# Patient Record
Sex: FEMALE | Race: WHITE | Employment: OTHER | URBAN - METROPOLITAN AREA
[De-identification: names, ages, dates, MRNs, and addresses within clinical notes are randomized per-mention and may not be internally consistent; named-entity substitution may affect disease eponyms.]

---

## 2017-06-21 ENCOUNTER — HOSPITAL ENCOUNTER (OUTPATIENT)
Dept: MAMMOGRAPHY | Age: 77
Discharge: HOME | End: 2017-06-21
Attending: INTERNAL MEDICINE
Payer: MEDICARE

## 2017-06-21 DIAGNOSIS — Z12.31 VISIT FOR SCREENING MAMMOGRAM: ICD-10-CM

## 2017-06-21 PROCEDURE — 77067 SCR MAMMO BI INCL CAD: CPT

## 2017-10-24 PROBLEM — G47.10 HYPERSOMNIA: Status: ACTIVE | Noted: 2017-10-24

## 2017-10-24 PROBLEM — G47.00 PERSISTENT DISORDER OF INITIATING OR MAINTAINING SLEEP: Status: ACTIVE | Noted: 2017-10-24

## 2017-10-24 PROBLEM — Z99.89 OSA ON CPAP: Status: ACTIVE | Noted: 2017-10-24

## 2017-10-24 PROBLEM — G47.33 OSA ON CPAP: Status: ACTIVE | Noted: 2017-10-24

## 2017-10-24 PROBLEM — G47.34 NOCTURNAL HYPOXEMIA: Status: ACTIVE | Noted: 2017-10-24

## 2018-01-18 ENCOUNTER — APPOINTMENT (OUTPATIENT)
Dept: GENERAL RADIOLOGY | Age: 78
End: 2018-01-18
Attending: EMERGENCY MEDICINE
Payer: MEDICARE

## 2018-01-18 ENCOUNTER — HOSPITAL ENCOUNTER (EMERGENCY)
Age: 78
Discharge: HOME | End: 2018-01-18
Attending: EMERGENCY MEDICINE
Payer: MEDICARE

## 2018-01-18 VITALS
HEIGHT: 63 IN | SYSTOLIC BLOOD PRESSURE: 168 MMHG | TEMPERATURE: 97.3 F | RESPIRATION RATE: 20 BRPM | HEART RATE: 71 BPM | OXYGEN SATURATION: 96 % | BODY MASS INDEX: 29.77 KG/M2 | WEIGHT: 168 LBS | DIASTOLIC BLOOD PRESSURE: 71 MMHG

## 2018-01-18 DIAGNOSIS — S90.31XA CONTUSION OF RIGHT FOOT, INITIAL ENCOUNTER: Primary | ICD-10-CM

## 2018-01-18 PROCEDURE — 99281 EMR DPT VST MAYX REQ PHY/QHP: CPT | Performed by: EMERGENCY MEDICINE

## 2018-01-18 PROCEDURE — 73630 X-RAY EXAM OF FOOT: CPT

## 2018-01-19 NOTE — DISCHARGE INSTRUCTIONS

## 2018-01-19 NOTE — ED TRIAGE NOTES
Pt c/o right foot pain,yesterday ? object fell on foot from top of refrigerator, today c/o increase pain and swelling.

## 2018-01-19 NOTE — ED PROVIDER NOTES
HPI Comments: 48-year-old female states that several things fell off from on top of her refrigerator yesterday and fell on her right foot. She had pain initially, but then pain resolved. She had been walking on it all day and then suddenly developed pain when walking to the mailbox. Noticed swelling when taking off her [employer] shoes. Denies any numbness or tingling. Has not taken anything for pain. Patient is a 65 y.o. female presenting with foot injury. The history is provided by the patient. Foot Injury    Pertinent negatives include no numbness. Past Medical History:   Diagnosis Date    Chronic pain     right foot    Menopause     KARL on CPAP 10/24/2017    Persistent disorder of initiating or maintaining sleep 10/24/2017    Psychiatric disorder     anxiety-worry    Unspecified sleep apnea     cpap ([place] DOS)       Past Surgical History:   Procedure Laterality Date    HX BREAST BIOPSY      HX BREAST LUMPECTOMY      Bilateral, benign    HX CATARACT REMOVAL   &    bilateral intraocular lenses    HX CYST INCISION AND DRAINAGE Left      &     HX HYSTERECTOMY  24 yrs ago   [place] [facility] ORTHOPAEDIC      r ankle         Family History:   Problem Relation Age of Onset    Malignant Hyperthermia Neg Hx     Pseudocholinesterase Deficiency Neg Hx     Delayed Awakening Neg Hx     Post-op Nausea/Vomiting Neg Hx     Emergence Delirium Neg Hx     Post-op Cognitive Dysfunction Neg Hx     Other Neg Hx     Breast Cancer Neg Hx     Heart Failure Mother     Cancer Father      parotid gland    Kidney Disease Sister       age 54 from kidney disease    Heart Failure Brother     Dementia Sister    Natalie Frantz Alzheimer Sister       age 80    Dementia Sister     Dementia Sister      vasculat-stroke       Social History     Social History    Marital status:      Spouse name: N/A    Number of children: N/A    Years of education: N/A     Occupational History    Not on file. Social History Main Topics    Smoking status: Never Smoker    Smokeless tobacco: Never Used    Alcohol use No    Drug use: No    Sexual activity: Not on file     Other Topics Concern    Not on file     Social History Narrative         ALLERGIES: Depakote [divalproex] and Penicillins    Review of Systems   Constitutional: Negative for fever. Musculoskeletal: Positive for arthralgias. Negative for joint swelling. Skin: Negative for wound. Neurological: Negative for weakness and numbness. Vitals:    01/18/18 2037   BP: 168/71   Pulse: 71   Resp: 20   Temp: 97.3 °F (36.3 °C)   SpO2: 96%   Weight: 76.2 kg (168 lb)   Height: 5' 3\" (1.6 m)            Physical Exam   Constitutional: She appears well-developed and well-nourished. HENT:   Head: Normocephalic and atraumatic. Eyes: Conjunctivae are normal. Pupils are equal, round, and reactive to light. Neck: Neck supple. Musculoskeletal:        Right foot: There is tenderness. There is normal range of motion, no swelling, normal capillary refill and no deformity. Feet:    Neurological: She is alert. Skin: Skin is warm and dry. Psychiatric: She has a normal mood and affect. Nursing note and vitals reviewed. MDM  Number of Diagnoses or Management Options  Diagnosis management comments: Parts of this document were created using dragon voice recognition software. The chart has been reviewed but errors may still be present. No fx. Possible contusion/sprain. Advised pcp follow up. I discussed the results of all labs, procedures, radiographs, and treatments with the patient and available family. Treatment plan is agreed upon and the patient is ready for discharge. Questions about treatment in the ED and differential diagnosis of presenting condition were answered.   Patient was given verbal discharge instructions including, but not limited to, importance of returning to the emergency department for any concern of worsening or continued symptoms. Instructions were given to follow up with a primary care provider or specialist within 1-2 days. Adverse effects of medications, if prescribed, were discussed and patient was advised to refrain from significant physical activity until followed up by primary care physician and to not drive or operate heavy machinery after taking any sedating substances. Amount and/or Complexity of Data Reviewed  Tests in the radiology section of CPT®: ordered and reviewed (Xr Foot Rt Min 3 V    Result Date: 1/18/2018  EXAM:  XR FOOT RT MIN 3 V INDICATION:   injury COMPARISON:  None. FINDINGS:  3 views of the right foot demonstrate a healed stress fracture the fifth metatarsal diaphysis. No acute fractures are identified. Bones are anatomically aligned.  The soft tissues are normal..      IMPRESSION: No acute abnormalities.     )      ED Course       Procedures

## 2018-07-19 ENCOUNTER — HOSPITAL ENCOUNTER (OUTPATIENT)
Dept: MAMMOGRAPHY | Age: 78
Discharge: HOME | End: 2018-07-19
Attending: INTERNAL MEDICINE
Payer: MEDICARE

## 2018-07-19 DIAGNOSIS — Z12.31 VISIT FOR SCREENING MAMMOGRAM: ICD-10-CM

## 2018-07-19 PROCEDURE — 77067 SCR MAMMO BI INCL CAD: CPT

## 2019-12-19 ENCOUNTER — HOSPITAL ENCOUNTER (OUTPATIENT)
Dept: MAMMOGRAPHY | Age: 79
Discharge: HOME | End: 2019-12-19
Attending: INTERNAL MEDICINE
Payer: MEDICARE

## 2019-12-19 DIAGNOSIS — Z12.31 ENCOUNTER FOR SCREENING MAMMOGRAM FOR MALIGNANT NEOPLASM OF BREAST: ICD-10-CM

## 2019-12-19 PROCEDURE — 77067 SCR MAMMO BI INCL CAD: CPT

## 2020-07-10 PROBLEM — Z00.00 ANNUAL PHYSICAL EXAM: Status: ACTIVE | Noted: 2020-07-10

## 2020-08-09 PROBLEM — Z00.00 ANNUAL PHYSICAL EXAM: Status: RESOLVED | Noted: 2020-07-10 | Resolved: 2020-08-09

## 2020-11-20 ENCOUNTER — TRANSCRIBE ORDER (OUTPATIENT)
Dept: SCHEDULING | Age: 80
End: 2020-11-20

## 2020-11-20 DIAGNOSIS — Z12.31 SCREENING MAMMOGRAM, ENCOUNTER FOR: Primary | ICD-10-CM

## 2020-12-23 ENCOUNTER — HOSPITAL ENCOUNTER (OUTPATIENT)
Dept: MAMMOGRAPHY | Age: 80
Discharge: HOME | End: 2020-12-23
Attending: INTERNAL MEDICINE
Payer: MEDICARE

## 2020-12-23 DIAGNOSIS — Z12.31 SCREENING MAMMOGRAM, ENCOUNTER FOR: ICD-10-CM

## 2020-12-23 PROCEDURE — 77067 SCR MAMMO BI INCL CAD: CPT

## 2021-12-27 ENCOUNTER — TRANSCRIBE ORDER (OUTPATIENT)
Dept: SCHEDULING | Age: 81
End: 2021-12-27

## 2021-12-27 DIAGNOSIS — Z12.31 VISIT FOR SCREENING MAMMOGRAM: Primary | ICD-10-CM

## 2021-12-30 ENCOUNTER — HOSPITAL ENCOUNTER (OUTPATIENT)
Dept: MAMMOGRAPHY | Age: 81
Discharge: HOME | End: 2021-12-30
Attending: INTERNAL MEDICINE
Payer: MEDICARE

## 2021-12-30 ENCOUNTER — TRANSCRIBE ORDER (OUTPATIENT)
Dept: REGISTRATION | Age: 81
End: 2021-12-30

## 2021-12-30 DIAGNOSIS — Z12.31 SCREENING MAMMOGRAM FOR HIGH-RISK PATIENT: Primary | ICD-10-CM

## 2021-12-30 DIAGNOSIS — Z12.31 SCREENING MAMMOGRAM FOR HIGH-RISK PATIENT: ICD-10-CM

## 2021-12-30 PROCEDURE — 77067 SCR MAMMO BI INCL CAD: CPT

## 2022-03-18 PROBLEM — G47.00 PERSISTENT DISORDER OF INITIATING OR MAINTAINING SLEEP: Status: ACTIVE | Noted: 2017-10-24

## 2022-03-18 PROBLEM — G47.34 NOCTURNAL HYPOXEMIA: Status: ACTIVE | Noted: 2017-10-24

## 2022-03-19 PROBLEM — G47.33 OSA ON CPAP: Status: ACTIVE | Noted: 2017-10-24

## 2022-03-19 PROBLEM — Z99.89 OSA ON CPAP: Status: ACTIVE | Noted: 2017-10-24

## 2022-03-19 PROBLEM — G47.10 HYPERSOMNIA: Status: ACTIVE | Noted: 2017-10-24

## 2022-08-27 ENCOUNTER — HOSPITAL ENCOUNTER (EMERGENCY)
Dept: GENERAL RADIOLOGY | Age: 82
Discharge: HOME | End: 2022-08-30
Payer: MEDICARE

## 2022-08-27 ENCOUNTER — HOSPITAL ENCOUNTER (EMERGENCY)
Age: 82
Discharge: HOME | End: 2022-08-27
Attending: EMERGENCY MEDICINE
Payer: MEDICARE

## 2022-08-27 VITALS
HEART RATE: 65 BPM | OXYGEN SATURATION: 97 % | HEIGHT: 63 IN | RESPIRATION RATE: 16 BRPM | WEIGHT: 166 LBS | DIASTOLIC BLOOD PRESSURE: 75 MMHG | TEMPERATURE: 97.8 F | BODY MASS INDEX: 29.41 KG/M2 | SYSTOLIC BLOOD PRESSURE: 176 MMHG

## 2022-08-27 DIAGNOSIS — F41.9 ANXIETY DISORDER, UNSPECIFIED TYPE: Primary | ICD-10-CM

## 2022-08-27 DIAGNOSIS — R03.0 TRANSIENT HYPERTENSION: ICD-10-CM

## 2022-08-27 DIAGNOSIS — F43.0 STRESS REACTION: ICD-10-CM

## 2022-08-27 LAB
ALBUMIN SERPL-MCNC: 3.8 G/DL (ref 3.2–4.6)
ALBUMIN/GLOB SERPL: 1 {RATIO} (ref 1.2–3.5)
ALP SERPL-CCNC: 75 U/L (ref 50–136)
ALT SERPL-CCNC: 25 U/L (ref 12–65)
ANION GAP SERPL CALC-SCNC: 6 MMOL/L (ref 7–16)
AST SERPL-CCNC: 18 U/L (ref 15–37)
BILIRUB SERPL-MCNC: 0.1 MG/DL (ref 0.2–1.1)
BUN SERPL-MCNC: 25 MG/DL (ref 8–23)
CALCIUM SERPL-MCNC: 9.2 MG/DL (ref 8.3–10.4)
CHLORIDE SERPL-SCNC: 105 MMOL/L (ref 98–107)
CO2 SERPL-SCNC: 27 MMOL/L (ref 21–32)
CREAT SERPL-MCNC: 0.86 MG/DL (ref 0.6–1)
ERYTHROCYTE [DISTWIDTH] IN BLOOD BY AUTOMATED COUNT: 12.9 % (ref 11.9–14.6)
GLOBULIN SER CALC-MCNC: 3.7 G/DL (ref 2.3–3.5)
GLUCOSE SERPL-MCNC: 113 MG/DL (ref 65–100)
HCT VFR BLD AUTO: 40.6 % (ref 35.8–46.3)
HGB BLD-MCNC: 13.4 G/DL (ref 11.7–15.4)
MAGNESIUM SERPL-MCNC: 2.6 MG/DL (ref 1.8–2.4)
MCH RBC QN AUTO: 30.9 PG (ref 26.1–32.9)
MCHC RBC AUTO-ENTMCNC: 33 G/DL (ref 31.4–35)
MCV RBC AUTO: 93.5 FL (ref 79.6–97.8)
NRBC # BLD: 0 K/UL (ref 0–0.2)
PLATELET # BLD AUTO: 247 K/UL (ref 150–450)
PMV BLD AUTO: 9.7 FL (ref 9.4–12.3)
POTASSIUM SERPL-SCNC: 4.3 MMOL/L (ref 3.5–5.1)
PROT SERPL-MCNC: 7.5 G/DL (ref 6.3–8.2)
RBC # BLD AUTO: 4.34 M/UL (ref 4.05–5.2)
SODIUM SERPL-SCNC: 138 MMOL/L (ref 136–145)
TROPONIN I SERPL HS-MCNC: 6.7 PG/ML (ref 0–14)
WBC # BLD AUTO: 7 K/UL (ref 4.3–11.1)

## 2022-08-27 PROCEDURE — 93005 ELECTROCARDIOGRAM TRACING: CPT | Performed by: EMERGENCY MEDICINE

## 2022-08-27 PROCEDURE — 85027 COMPLETE CBC AUTOMATED: CPT

## 2022-08-27 PROCEDURE — 71045 X-RAY EXAM CHEST 1 VIEW: CPT

## 2022-08-27 PROCEDURE — 83735 ASSAY OF MAGNESIUM: CPT

## 2022-08-27 PROCEDURE — 80053 COMPREHEN METABOLIC PANEL: CPT

## 2022-08-27 PROCEDURE — 84484 ASSAY OF TROPONIN QUANT: CPT

## 2022-08-27 PROCEDURE — 99285 EMERGENCY DEPT VISIT HI MDM: CPT

## 2022-08-27 RX ORDER — GLUCOSAMINE/CHONDR SU A SOD 750-600 MG
TABLET ORAL DAILY
COMMUNITY

## 2022-08-27 ASSESSMENT — ENCOUNTER SYMPTOMS
PHOTOPHOBIA: 0
BACK PAIN: 0
TROUBLE SWALLOWING: 0
VOMITING: 0
WHEEZING: 0
COLOR CHANGE: 0
EYE REDNESS: 0
SHORTNESS OF BREATH: 0
ABDOMINAL PAIN: 0
CHEST TIGHTNESS: 1
NAUSEA: 0
VOICE CHANGE: 0

## 2022-08-27 ASSESSMENT — PAIN - FUNCTIONAL ASSESSMENT: PAIN_FUNCTIONAL_ASSESSMENT: NONE - DENIES PAIN

## 2022-08-27 NOTE — ED NOTES
I have reviewed discharge instructions with the patient. The patient verbalized understanding. Patient left ED via Discharge Method: ambulatory to Home with self. Elva Ortez Opportunity for questions and clarification provided. Patient given 0 scripts. To continue your aftercare when you leave the hospital, you may receive an automated call from our care team to check in on how you are doing. This is a free service and part of our promise to provide the best care and service to meet your aftercare needs.  If you have questions, or wish to unsubscribe from this service please call [phone]. Thank you for Choosing our [facility] Department.         Liv Goldman RN  08/27/22 3090

## 2022-08-27 NOTE — DISCHARGE INSTRUCTIONS
As we discussed, it is important that you call and speak to your primary care physician and discussed your blood pressure issues however also discussed their degree of stress and anxiety you have been dealing with  Follow-up with your pulmonologist as well  I suggest that she only take her blood pressure twice daily  Return to the ER for any new, worsening or life-threatening symptoms Patient rescheduled to 12/13/2021.

## 2022-08-27 NOTE — ED NOTES
Pt resting in NAD. MD Brynn Kline will go update pt at this time.       Dione Meyers RN  08/27/22 6026

## 2022-08-27 NOTE — ED PROVIDER NOTES
[facility] Provider Note                   PCP:                None Provider               Age: 79 y.o. Sex: female     No diagnosis found. DISPOSITION          MDM  Number of Diagnoses or Management Options  Anxiety disorder, unspecified type  Stress reaction  Transient hypertension  Diagnosis management comments: Well-appearing. Plan for screening labs EKG.    5:26 PM  Laboratory testing here stable including cardiac enzymes and electrolytes. I do not feel she needs serial cardiac enzymes given the recurrence of her symptoms and timeframe of most recent episode being greater than 6 hours. 6:08 PM  Discussed with patient given results of testing. I feel she is stable for discharge. I have encouraged her to speak to her primary care physician concerning her degree of stress and anxiety at this most likely is a contributing factor for her blood pressure issues. However going forward I feel it is only necessary that she takes her blood pressure twice daily, if she continues to have some elevated readings she may need to be initiated on antihypertensive medications.   However I will defer that to her PCP       Amount and/or Complexity of Data Reviewed  Clinical lab tests: ordered and reviewed  Tests in the radiology section of CPT®: ordered and reviewed  Independent visualization of images, tracings, or specimens: yes (EKG interpretation: Sinus rhythm, rate of 76, normal axis, no blocks, no acute ST segment changes)    Risk of Complications, Morbidity, and/or Mortality  Presenting problems: moderate  Diagnostic procedures: moderate  Management options: moderate    Patient Progress  Patient progress: stable       Orders Placed This Encounter   Procedures    XR CHEST PORTABLE    CBC    Comprehensive Metabolic Panel    Troponin    Magnesium    Cardiac Monitor    Pulse Oximetry    EKG 12 Lead    Saline lock IV        Medications - No data to display    New Prescriptions    No medications on file        Kenyon Peng is a 79 y.o. female who presents to the Emergency Department with chief complaint of    Chief Complaint   Patient presents with    Palpitations     Patient presents complaining of \"racing heart rate\" and irregular blood pressures. Patient reports ongoing symptoms since COVID over the summer. Patient presents the ER with complaints of elevated heart rate, hypertension, chest discomfort. Patient states for the past couple days she has had intermittent episodes of \"deep blast in her chest\". She describes it as a centralized discomfort and feels that her heart is racing. States she immediately gets up and checks her blood pressure. States her blood pressure has been elevated. She is discussed this with her primary care physician. States that her blood pressure tends to vary. Does not take any blood pressure medication. She does report issues with stress and anxiety    The history is provided by the patient. Palpitations  Palpitations quality:  Irregular  Onset quality:  Gradual  Timing:  Intermittent  Chronicity:  New  Context: anxiety    Context: not dehydration and not exercise    Relieved by:  Nothing  Worsened by:  Nothing  Associated symptoms: chest pressure    Associated symptoms: no back pain, no chest pain, no dizziness, no malaise/fatigue, no nausea, no numbness, no shortness of breath, no syncope, no vomiting and no weakness        Review of Systems   Constitutional:  Negative for fatigue, fever, malaise/fatigue and unexpected weight change. HENT:  Negative for congestion, dental problem, tinnitus, trouble swallowing and voice change. Eyes:  Negative for photophobia and redness. Respiratory:  Positive for chest tightness. Negative for shortness of breath and wheezing. Cardiovascular:  Positive for palpitations. Negative for chest pain and syncope. Gastrointestinal:  Negative for abdominal pain, nausea and vomiting.    Endocrine: Negative for polydipsia, polyphagia and polyuria. Genitourinary:  Negative for decreased urine volume, flank pain, hematuria, vaginal discharge and vaginal pain. Musculoskeletal:  Negative for back pain, gait problem, joint swelling and neck stiffness. Skin:  Negative for color change and pallor. Allergic/Immunologic: Negative for food allergies and immunocompromised state. Neurological:  Negative for dizziness, syncope, speech difficulty, weakness and numbness. Hematological:  Negative for adenopathy. Does not bruise/bleed easily. Psychiatric/Behavioral:  Negative for behavioral problems, hallucinations, sleep disturbance and suicidal ideas. The patient is not hyperactive. All other systems reviewed and are negative. Past Medical History:   Diagnosis Date    Chronic pain     right foot    Menopause     BALJINDER on CPAP 10/24/2017    Persistent disorder of initiating or maintaining sleep 10/24/2017    Psychiatric disorder     anxiety-worry    Unspecified sleep apnea     cpap ([place] DOS)        Past Surgical History:   Procedure Laterality Date    BREAST BIOPSY Bilateral     negative per pt    BREAST CYST INCISION AND DRAINAGE Left      &     CATARACT REMOVAL   &    bilateral intraocular lenses    HYSTERECTOMY  24 yrs ago    ORTHOPEDIC SURGERY      r ankle        Family History   Problem Relation Age of Onset    Heart Failure Mother     Cancer Father         parotid gland    Kidney Disease Sister          age 54 from kidney disease    Heart Failure Brother     Dementia Sister     Alzheimer's Disease Sister          age 80    Dementia Sister     Dementia Sister         vasculat-stroke    Malig Hypertherm Neg Hx     Pseudochol.  Deficiency Neg Hx     Delayed Awakening Neg Hx     Post-op Nausea/Vomiting Neg Hx     Emergence Delirium Neg Hx     Post-op Cognitive Dysfunction Neg Hx     Other Neg Hx     Breast Cancer Neg Hx         Social History     Socioeconomic History    Marital status:    Tobacco Use    Smoking status: Never    Smokeless tobacco: Never   Substance and Sexual Activity    Alcohol use: No    Drug use: No         Penicillins and Valproic acid     Previous Medications    ASCORBIC ACID (VITAMIN C) 500 MG TABLET    Take by mouth daily as needed    COBALAMIN COMBINATIONS (B-12) 100-5000 MCG SUBL    Place under the tongue    MAGNESIUM OXIDE (MAG-OX) 400 MG TABLET    Take 400 mg by mouth    VITAMIN D3 (CHOLECALCIFEROL) 125 MCG (5000 UT) TABS TABLET    Take by mouth daily        Vitals signs and nursing note reviewed. Patient Vitals for the past 4 hrs:   Temp Pulse Resp BP SpO2   08/27/22 1539 98.4 °F (36.9 °C) 81 18 (!) 159/82 95 %          Physical Exam  Vitals and nursing note reviewed. Constitutional:       General: She is not in acute distress. Appearance: Normal appearance. She is not ill-appearing. HENT:      Head: Normocephalic and atraumatic. Right Ear: External ear normal.      Left Ear: External ear normal.   Eyes:      Extraocular Movements: Extraocular movements intact. Pupils: Pupils are equal, round, and reactive to light. Cardiovascular:      Rate and Rhythm: Normal rate and regular rhythm. Pulses: Normal pulses. Heart sounds: No murmur heard. No friction rub. Pulmonary:      Effort: Pulmonary effort is normal. No respiratory distress. Breath sounds: Normal breath sounds. No stridor. No rhonchi. Abdominal:      General: Abdomen is flat. There is no distension. Palpations: Abdomen is soft. There is no mass. Tenderness: There is no abdominal tenderness. Hernia: No hernia is present. Musculoskeletal:         General: No swelling, tenderness or signs of injury. Cervical back: Normal range of motion. No rigidity or tenderness. Skin:     General: Skin is warm. Coloration: Skin is not jaundiced or pale. Findings: No bruising or erythema. Neurological:      General: No focal deficit present. Mental Status: She is alert and oriented to person, place, and time. Cranial Nerves: No cranial nerve deficit. Sensory: No sensory deficit. Motor: No weakness. Psychiatric:         Mood and Affect: Mood normal.         Behavior: Behavior normal.        Procedures      Results Include:    Recent Results (from the past 24 hour(s))   CBC    Collection Time: 08/27/22  3:46 PM   Result Value Ref Range    WBC 7.0 4.3 - 11.1 K/uL    RBC 4.34 4.05 - 5.2 M/uL    Hemoglobin 13.4 11.7 - 15.4 g/dL    Hematocrit 40.6 35.8 - 46.3 %    MCV 93.5 79.6 - 97.8 FL    MCH 30.9 26.1 - 32.9 PG    MCHC 33.0 31.4 - 35.0 g/dL    RDW 12.9 11.9 - 14.6 %    Platelets 111 149 - 639 K/uL    MPV 9.7 9.4 - 12.3 FL    nRBC 0.00 0.0 - 0.2 K/uL          XR CHEST PORTABLE    (Results Pending)                          Voice dictation software was used during the making of this note. This software is not perfect and grammatical and other typographical errors may be present. This note has not been completely proofread for errors.      Jolanta Tian MD  08/27/22 6747 [facility], MD  08/27/22 368 Monroe Wing MD  08/27/22 6017

## 2022-08-27 NOTE — ED TRIAGE NOTES
Patient presents complaining of \"racing heart rate\" and irregular blood pressures. Patient reports ongoing symptoms since COVID over the summer.

## 2022-08-28 LAB
EKG ATRIAL RATE: 78 BPM
EKG DIAGNOSIS: NORMAL
EKG P AXIS: 50 DEGREES
EKG P-R INTERVAL: 146 MS
EKG Q-T INTERVAL: 392 MS
EKG QRS DURATION: 80 MS
EKG QTC CALCULATION (BAZETT): 446 MS
EKG R AXIS: -14 DEGREES
EKG T AXIS: 39 DEGREES
EKG VENTRICULAR RATE: 78 BPM

## 2022-09-19 NOTE — PROGRESS NOTES
[address] [place] [address] Court, [address] [facility]  [phone]    Patient Name:  Neal Borrero  YOB: 1940      Office Visit 9/20/2022    CHIEF COMPLAINT:    Chief Complaint   Patient presents with    Sleep Apnea         HISTORY OF PRESENT ILLNESS:  Patient is seen today for follow up of BALJINDER. Patient was diagnosed with sleep apnea in 1998 with AHI 18.6/hr with lowest oxygen saturation 89%. She is prescribed APAP  10-15 cm using a nasal mask. Download reveals 100% compliance on therapy with average nightly  use 6.5 hours. AHI is 6/hr with average pressure used 10.9-14.1 cm. She is having both obstructive and central events. She reports having covid in May. She has recovered well but is having more fatigue since having covid. She does report weight gain of about 10 lbs from being less active. She is using a nasal mask but is waking with a dry mouth. She feels the pressure is too low in the night. Will add chin strap and she will call prior to next visit if she continues to feel as if she needs more air. She was seen in the ER in August for hypertension but attributes this to anxiety and stress. She is apart of a group called [employer] through [employer] and is discussing things from her past causing anxiety while she is working through these issues.          Past Medical History:   Diagnosis Date    Chronic pain     right foot    Menopause     BALJINDER on CPAP 10/24/2017    Persistent disorder of initiating or maintaining sleep 10/24/2017    Psychiatric disorder     anxiety-worry    Unspecified sleep apnea     cpap ([place] DOS)         Patient Active Problem List   Diagnosis    Persistent disorder of initiating or maintaining sleep    Nocturnal hypoxemia    Hypersomnia    BALJINDER on CPAP          Past Surgical History:   Procedure Laterality Date    BREAST BIOPSY Bilateral 1996    negative per pt    CATARACT REMOVAL  7/98 &9/98    bilateral intraocular lenses    CYST INCISION AND DRAINAGE Left      &     HYSTERECTOMY (CERVIX STATUS UNKNOWN)  24 yrs ago    ORTHOPEDIC SURGERY      r ankle           Social History     Socioeconomic History    Marital status:      Spouse name: Not on file    Number of children: Not on file    Years of education: Not on file    Highest education level: Not on file   Occupational History    Not on file   Tobacco Use    Smoking status: Never    Smokeless tobacco: Never   Substance and Sexual Activity    Alcohol use: No    Drug use: No    Sexual activity: Not on file   Other Topics Concern    Not on file   Social History Narrative    Not on file     Social Determinants of Health     Financial Resource Strain: Not on file   Food Insecurity: Not on file   Transportation Needs: Not on file   Physical Activity: Not on file   Stress: Not on file   Social Connections: Not on file   Intimate Partner Violence: Not on file   Housing Stability: Not on file         Family History   Problem Relation Age of Onset    Heart Failure Mother     Cancer Father         parotid gland    Kidney Disease Sister          age 54 from kidney disease    Heart Failure Brother     Dementia Sister     Alzheimer's Disease Sister          age 80    Dementia Sister     Dementia Sister         vasculat-stroke    Malig Hypertherm Neg Hx     Pseudochol.  Deficiency Neg Hx     Delayed Awakening Neg Hx     Post-op Nausea/Vomiting Neg Hx     Emergence Delirium Neg Hx     Post-op Cognitive Dysfunction Neg Hx     Other Neg Hx     Breast Cancer Neg Hx          Allergies   Allergen Reactions    Penicillins Other (See Comments) and Rash    Valproic Acid Other (See Comments)     pain         Current Outpatient Medications   Medication Sig    cyanocobalamin 1000 MCG tablet Take 1,000 mcg by mouth daily    Lutein-Zeaxanthin 25-5 MG CAPS Take by mouth daily    ascorbic acid (VITAMIN C) 500 MG tablet Take by mouth daily as needed    vitamin D3 (CHOLECALCIFEROL) 125 MCG (5000 UT) TABS tablet Take by mouth daily    Cobalamin Combinations (B-12) 100-5000 MCG SUBL Place under the tongue    magnesium oxide (MAG-OX) 400 MG tablet Take 400 mg by mouth     No current facility-administered medications for this visit. REVIEW OF SYSTEMS:   CONSTITUTIONAL:   There is no history of fever, chills, night sweats, weight loss, weight gain, persistent fatigue, or lethargy/hypersomnolence. CARDIAC:   No chest pain, pressure, discomfort, palpitations, orthopnea, murmurs, or edema. GI:   No dysphagia, heartburn reflux, nausea/vomiting, diarrhea, abdominal pain, or bleeding. NEURO:   There is no history of AMS, persistent headache, decreased level of consciousness, seizures, or motor or sensory deficits. PHYSICAL EXAM:    Vitals:    09/20/22 1128   BP: 116/70   Pulse: 97   Resp: 14   Temp: 98.1 °F (36.7 °C)   SpO2: 93%   Weight: 167 lb (75.8 kg)   Height: 5' 3\" (1.6 m)        BMI 29.58       GENERAL APPEARANCE:   The patient is normal weight and in no respiratory distress. HEENT:   PERRL. Conjunctivae unremarkable. Nasal mucosa is without epistaxis, exudate, or polyps. Gums and dentition are unremarkable. There is no oropharyngeal narrowing. TMs are clear. NECK/LYMPHATIC:   Symmetrical with no elevation of jugular venous pulsation. Trachea midline. No thyroid enlargement. No cervical adenopathy. LUNGS:   Normal respiratory effort with symmetrical lung expansion. Breath sounds clear bilaterally. HEART:   There is a regular rate and rhythm. No murmur, rub, or gallop. There is no edema in the lower extremities. ABDOMEN:   Soft and non-tender. No hepatosplenomegaly. Bowel sounds are normal.     NEURO:   The patient is alert and oriented to person, place, and time. Memory appears intact and mood is normal.  No gross sensorimotor deficits are present. ASSESSMENT:  (Medical Decision Making)      Diagnosis Orders   1.  BALJINDER on CPAP  DME - DURABLE MEDICAL EQUIPMENT Tubing (1 per 3 mon)  (   X  )- Disposable Filter (2 per mon)  (   x  )-Btjqcg Humidifier (1 per year)     (  x   )-Dhdecpeml (sometimes used with Full Face Mask) (1 per 6 mos)  (    )-Tubing-without heat (1 per 3 mos)  (     )-Non-Disposable Filter (1 per 6 mos)  (  x   )-Water Chamber (1 per 6 mos)  (     )-Humidifier non-heated (1 per 5 yrs)      Signed Date: 9/20/2022  Electronically Signed By: DEONTE Lopez CNP  Electronically Dated:  9/20/2022       No orders of the defined types were placed in this encounter. Collaborating Physician: Dr. Latisha Mccoy    Over 50% of today's office visit was spent in face to face time reviewing test results, prognosis, importance of compliance, education about disease process, benefits of medications, instructions for management of acute flare-ups, and follow up plans. Total face to face time spent with patient was 20 minutes.         DEONTE Lopez CNP  Electronically signed

## 2022-09-20 ENCOUNTER — OFFICE VISIT (OUTPATIENT)
Dept: SLEEP MEDICINE | Age: 82
End: 2022-09-20
Payer: MEDICARE

## 2022-09-20 VITALS
HEIGHT: 63 IN | RESPIRATION RATE: 14 BRPM | WEIGHT: 167 LBS | SYSTOLIC BLOOD PRESSURE: 116 MMHG | BODY MASS INDEX: 29.59 KG/M2 | HEART RATE: 97 BPM | TEMPERATURE: 98.1 F | DIASTOLIC BLOOD PRESSURE: 70 MMHG | OXYGEN SATURATION: 93 %

## 2022-09-20 DIAGNOSIS — Z99.89 OSA ON CPAP: Primary | ICD-10-CM

## 2022-09-20 DIAGNOSIS — G47.34 NOCTURNAL HYPOXEMIA: ICD-10-CM

## 2022-09-20 DIAGNOSIS — G47.33 OSA ON CPAP: Primary | ICD-10-CM

## 2022-09-20 PROCEDURE — 1036F TOBACCO NON-USER: CPT | Performed by: NURSE PRACTITIONER

## 2022-09-20 PROCEDURE — G8427 DOCREV CUR MEDS BY ELIG CLIN: HCPCS | Performed by: NURSE PRACTITIONER

## 2022-09-20 PROCEDURE — G8419 CALC BMI OUT NRM PARAM NOF/U: HCPCS | Performed by: NURSE PRACTITIONER

## 2022-09-20 PROCEDURE — 1123F ACP DISCUSS/DSCN MKR DOCD: CPT | Performed by: NURSE PRACTITIONER

## 2022-09-20 PROCEDURE — 1090F PRES/ABSN URINE INCON ASSESS: CPT | Performed by: NURSE PRACTITIONER

## 2022-09-20 PROCEDURE — 99213 OFFICE O/P EST LOW 20 MIN: CPT | Performed by: NURSE PRACTITIONER

## 2022-09-20 PROCEDURE — G8400 PT W/DXA NO RESULTS DOC: HCPCS | Performed by: NURSE PRACTITIONER

## 2022-09-20 ASSESSMENT — SLEEP AND FATIGUE QUESTIONNAIRES
HOW LIKELY ARE YOU TO NOD OFF OR FALL ASLEEP WHILE WATCHING TV: 3
HOW LIKELY ARE YOU TO NOD OFF OR FALL ASLEEP WHEN YOU ARE A PASSENGER IN A CAR FOR AN HOUR WITHOUT A BREAK: 2
HOW LIKELY ARE YOU TO NOD OFF OR FALL ASLEEP WHILE LYING DOWN TO REST IN THE AFTERNOON WHEN CIRCUMSTANCES PERMIT: 3
HOW LIKELY ARE YOU TO NOD OFF OR FALL ASLEEP WHILE SITTING QUIETLY AFTER LUNCH WITHOUT ALCOHOL: 2
HOW LIKELY ARE YOU TO NOD OFF OR FALL ASLEEP WHILE SITTING INACTIVE IN A PUBLIC PLACE: 3
HOW LIKELY ARE YOU TO NOD OFF OR FALL ASLEEP WHILE SITTING AND TALKING TO SOMEONE: 0
ESS TOTAL SCORE: 17
HOW LIKELY ARE YOU TO NOD OFF OR FALL ASLEEP WHILE SITTING AND READING: 3
HOW LIKELY ARE YOU TO NOD OFF OR FALL ASLEEP IN A CAR, WHILE STOPPED FOR A FEW MINUTES IN TRAFFIC: 1

## 2022-09-20 NOTE — PATIENT INSTRUCTIONS
Continue APAP 10-15 cm  Renew supplies   Add chin strap to nasal mask  Follow up in 6 months to monitor AHI or sooner if needed

## 2022-10-28 ENCOUNTER — TELEPHONE (OUTPATIENT)
Dept: PULMONOLOGY | Age: 82
End: 2022-10-28

## 2022-10-28 DIAGNOSIS — Z99.89 OSA ON CPAP: Primary | ICD-10-CM

## 2022-10-28 DIAGNOSIS — G47.33 OSA ON CPAP: Primary | ICD-10-CM

## 2022-10-28 NOTE — TELEPHONE ENCOUNTER
Patient is calling because she feels like she needs cpap turned up just a little bit.  (Pt was last seen on 9/20/22)

## 2022-11-03 NOTE — TELEPHONE ENCOUNTER
Left message on patient's voice mail of pressure change and pressure changed in airview and order put in go scripts and sent to [facility].

## 2022-12-20 ENCOUNTER — TRANSCRIBE ORDERS (OUTPATIENT)
Dept: SCHEDULING | Age: 82
End: 2022-12-20

## 2022-12-20 DIAGNOSIS — Z12.31 SCREENING MAMMOGRAM FOR HIGH-RISK PATIENT: Primary | ICD-10-CM

## 2023-01-07 ENCOUNTER — HOSPITAL ENCOUNTER (OUTPATIENT)
Dept: MAMMOGRAPHY | Age: 83
End: 2023-01-07
Payer: MEDICARE

## 2023-01-07 DIAGNOSIS — Z12.31 SCREENING MAMMOGRAM FOR HIGH-RISK PATIENT: ICD-10-CM

## 2023-01-07 PROCEDURE — 77067 SCR MAMMO BI INCL CAD: CPT

## 2023-03-17 NOTE — PROGRESS NOTES
no respiratory distress. HEENT:   PERRL. Conjunctivae unremarkable. Nasal mucosa is without epistaxis, exudate, or polyps. Gums and dentition are unremarkable. There is  oropharyngeal narrowing. NECK/LYMPHATIC:   Symmetrical with no elevation of jugular venous pulsation. Trachea midline. No thyroid enlargement. No cervical adenopathy. LUNGS:   Normal respiratory effort with symmetrical lung expansion. Breath sounds clear bilaterally . HEART:   There is a regular rate and rhythm. No murmur, rub, or gallop. There is no edema in the lower extremities. ABDOMEN:   Soft and non-tender. No hepatosplenomegaly. Bowel sounds are normal.     NEURO:   The patient is alert and oriented to person, place, and time. Memory appears intact and mood is normal.  No gross sensorimotor deficits are present. ASSESSMENT:  (Medical Decision Making)      Diagnosis Orders   1. BALJINDER on CPAP     Will adjust pressure to APAP 13-16 cm to help with sleep maintenance  Replacement machine will be ordered     2. Nocturnal hypoxemia     Improved on pap therapy      3. Hypersomnia, unspecified     Likely due to poor sleep quality and sleep maintenance. PCP has recommended lexapro and this will likely help with sleep as well     4. Persistent disordered of initiating or maintaining sleep-  she needs to put the cpap on as soon as she gets into bed. Will adjust pressure as well.        PLAN:  Change pressure to APAP 13-16cm  Replacement machine will be ordered  Try extended release melatonin  Practice routine of putting cpap on as soon as she gets into bed    Follow up will be in 6 months or sooner if needed    Orders Placed This Encounter   Procedures    DME - [address]  Replacement machine  Change pressure to APAP 13-16cm    [facility]  Phone: [phone] [address]  Dept: [phone]      Patient Name: Dany Torres  :

## 2023-03-21 ENCOUNTER — OFFICE VISIT (OUTPATIENT)
Dept: SLEEP MEDICINE | Age: 83
End: 2023-03-21
Payer: MEDICARE

## 2023-03-21 VITALS
SYSTOLIC BLOOD PRESSURE: 130 MMHG | RESPIRATION RATE: 14 BRPM | OXYGEN SATURATION: 98 % | BODY MASS INDEX: 30.94 KG/M2 | TEMPERATURE: 96.9 F | HEIGHT: 63 IN | HEART RATE: 77 BPM | DIASTOLIC BLOOD PRESSURE: 60 MMHG | WEIGHT: 174.6 LBS

## 2023-03-21 DIAGNOSIS — G47.34 NOCTURNAL HYPOXEMIA: ICD-10-CM

## 2023-03-21 DIAGNOSIS — G47.33 OSA ON CPAP: Primary | ICD-10-CM

## 2023-03-21 DIAGNOSIS — G47.00 PERSISTENT DISORDER OF INITIATING OR MAINTAINING SLEEP: ICD-10-CM

## 2023-03-21 DIAGNOSIS — Z99.89 OSA ON CPAP: Primary | ICD-10-CM

## 2023-03-21 DIAGNOSIS — G47.10 HYPERSOMNIA, UNSPECIFIED: ICD-10-CM

## 2023-03-21 PROCEDURE — 1123F ACP DISCUSS/DSCN MKR DOCD: CPT | Performed by: NURSE PRACTITIONER

## 2023-03-21 PROCEDURE — 1036F TOBACCO NON-USER: CPT | Performed by: NURSE PRACTITIONER

## 2023-03-21 PROCEDURE — G8427 DOCREV CUR MEDS BY ELIG CLIN: HCPCS | Performed by: NURSE PRACTITIONER

## 2023-03-21 PROCEDURE — 99213 OFFICE O/P EST LOW 20 MIN: CPT | Performed by: NURSE PRACTITIONER

## 2023-03-21 PROCEDURE — G8484 FLU IMMUNIZE NO ADMIN: HCPCS | Performed by: NURSE PRACTITIONER

## 2023-03-21 PROCEDURE — G8417 CALC BMI ABV UP PARAM F/U: HCPCS | Performed by: NURSE PRACTITIONER

## 2023-03-21 PROCEDURE — 1090F PRES/ABSN URINE INCON ASSESS: CPT | Performed by: NURSE PRACTITIONER

## 2023-03-21 PROCEDURE — G8400 PT W/DXA NO RESULTS DOC: HCPCS | Performed by: NURSE PRACTITIONER

## 2023-03-21 ASSESSMENT — SLEEP AND FATIGUE QUESTIONNAIRES
HOW LIKELY ARE YOU TO NOD OFF OR FALL ASLEEP IN A CAR, WHILE STOPPED FOR A FEW MINUTES IN TRAFFIC: 0
HOW LIKELY ARE YOU TO NOD OFF OR FALL ASLEEP WHILE SITTING QUIETLY AFTER LUNCH WITHOUT ALCOHOL: 2
HOW LIKELY ARE YOU TO NOD OFF OR FALL ASLEEP WHILE WATCHING TV: 0
HOW LIKELY ARE YOU TO NOD OFF OR FALL ASLEEP WHILE SITTING AND READING: 3
HOW LIKELY ARE YOU TO NOD OFF OR FALL ASLEEP WHILE SITTING INACTIVE IN A PUBLIC PLACE: 2
HOW LIKELY ARE YOU TO NOD OFF OR FALL ASLEEP WHEN YOU ARE A PASSENGER IN A CAR FOR AN HOUR WITHOUT A BREAK: 1
HOW LIKELY ARE YOU TO NOD OFF OR FALL ASLEEP WHILE SITTING AND TALKING TO SOMEONE: 1
ESS TOTAL SCORE: 12
HOW LIKELY ARE YOU TO NOD OFF OR FALL ASLEEP WHILE LYING DOWN TO REST IN THE AFTERNOON WHEN CIRCUMSTANCES PERMIT: 3

## 2023-03-21 NOTE — PATIENT INSTRUCTIONS
Change pressure to APAP 13-16 cm  Order new machine  Try extended release melatonin   Practice routine of putting cpap on as soon as you get into bed.     Follow up will be in 6 months or sooner if needed

## 2023-06-06 ENCOUNTER — OFFICE VISIT (OUTPATIENT)
Dept: ORTHOPEDIC SURGERY | Age: 83
End: 2023-06-06
Payer: MEDICARE

## 2023-06-06 DIAGNOSIS — M25.511 RIGHT SHOULDER PAIN, UNSPECIFIED CHRONICITY: Primary | ICD-10-CM

## 2023-06-06 DIAGNOSIS — M62.838 NECK MUSCLE SPASM: ICD-10-CM

## 2023-06-06 PROCEDURE — G8427 DOCREV CUR MEDS BY ELIG CLIN: HCPCS | Performed by: ORTHOPAEDIC SURGERY

## 2023-06-06 PROCEDURE — 1123F ACP DISCUSS/DSCN MKR DOCD: CPT | Performed by: ORTHOPAEDIC SURGERY

## 2023-06-06 PROCEDURE — 20610 DRAIN/INJ JOINT/BURSA W/O US: CPT | Performed by: ORTHOPAEDIC SURGERY

## 2023-06-06 PROCEDURE — G8417 CALC BMI ABV UP PARAM F/U: HCPCS | Performed by: ORTHOPAEDIC SURGERY

## 2023-06-06 PROCEDURE — 1036F TOBACCO NON-USER: CPT | Performed by: ORTHOPAEDIC SURGERY

## 2023-06-06 PROCEDURE — 99204 OFFICE O/P NEW MOD 45 MIN: CPT | Performed by: ORTHOPAEDIC SURGERY

## 2023-06-06 PROCEDURE — G8400 PT W/DXA NO RESULTS DOC: HCPCS | Performed by: ORTHOPAEDIC SURGERY

## 2023-06-06 PROCEDURE — 1090F PRES/ABSN URINE INCON ASSESS: CPT | Performed by: ORTHOPAEDIC SURGERY

## 2023-06-06 RX ORDER — METHYLPREDNISOLONE ACETATE 40 MG/ML
80 INJECTION, SUSPENSION INTRA-ARTICULAR; INTRALESIONAL; INTRAMUSCULAR; SOFT TISSUE ONCE
Status: COMPLETED | OUTPATIENT
Start: 2023-06-06 | End: 2023-06-06

## 2023-06-06 RX ADMIN — METHYLPREDNISOLONE ACETATE 80 MG: 40 INJECTION, SUSPENSION INTRA-ARTICULAR; INTRALESIONAL; INTRAMUSCULAR; SOFT TISSUE at 11:46

## 2023-06-06 NOTE — PATIENT INSTRUCTIONS
towel    Roll up a towel lengthwise. Hold the towel above and behind your head with the arm that is not sore. With your sore arm, reach behind your back and grasp the towel. Using the arm above your head, pull the towel upward until you feel a stretch on the front and outside of your sore shoulder. Hold 15 to 30 seconds. Relax and move the towel back down to the starting position. Repeat 2 to 4 times. Shoulder blade squeeze    While standing with your arms at your sides, squeeze your shoulder blades together. Do not raise your shoulders up as you are squeezing. Hold for 6 seconds. Repeat 8 to 12 times. Follow-up care is a key part of your treatment and safety. Be sure to make and go to all appointments, and call your doctor if you are having problems. It's also a good idea to know your test results and keep a list of the medicines you take. Current as of: November 9, 2022               Content Version: 13.6  © 2006-2023 [employer]. Care instructions adapted under license by [facility] ([facility]). If you have questions about a medical condition or this instruction, always ask your healthcare professional. [place] [address] any warranty or liability for your use of this information.

## 2023-06-06 NOTE — PROGRESS NOTES
glenohumeral joint injection. The affected right shoulder was sterilely prepped in standard fashion and injected with 2 cc of depo medrol  (40mg/ml), 2 cc of 1% Lidocaine, and 2 cc of 0.5% Marcaine into the JOINT SPACE. The patient tolerated the injection well. Return in about 6 weeks (around 7/18/2023). Thank you for the opportunity to see and take care of your patients. If you ever have any questions please give me a call.    Sincerely,  Myriam Lo MD  06/06/23

## 2023-07-25 ENCOUNTER — OFFICE VISIT (OUTPATIENT)
Dept: ORTHOPEDIC SURGERY | Age: 83
End: 2023-07-25
Payer: MEDICARE

## 2023-07-25 DIAGNOSIS — M62.838 NECK MUSCLE SPASM: ICD-10-CM

## 2023-07-25 DIAGNOSIS — M25.511 RIGHT SHOULDER PAIN, UNSPECIFIED CHRONICITY: Primary | ICD-10-CM

## 2023-07-25 PROCEDURE — 99213 OFFICE O/P EST LOW 20 MIN: CPT | Performed by: ORTHOPAEDIC SURGERY

## 2023-07-25 PROCEDURE — G8400 PT W/DXA NO RESULTS DOC: HCPCS | Performed by: ORTHOPAEDIC SURGERY

## 2023-07-25 PROCEDURE — 1036F TOBACCO NON-USER: CPT | Performed by: ORTHOPAEDIC SURGERY

## 2023-07-25 PROCEDURE — 1090F PRES/ABSN URINE INCON ASSESS: CPT | Performed by: ORTHOPAEDIC SURGERY

## 2023-07-25 PROCEDURE — G8427 DOCREV CUR MEDS BY ELIG CLIN: HCPCS | Performed by: ORTHOPAEDIC SURGERY

## 2023-07-25 PROCEDURE — G8417 CALC BMI ABV UP PARAM F/U: HCPCS | Performed by: ORTHOPAEDIC SURGERY

## 2023-07-25 PROCEDURE — 1123F ACP DISCUSS/DSCN MKR DOCD: CPT | Performed by: ORTHOPAEDIC SURGERY

## 2023-07-25 NOTE — PROGRESS NOTES
Name: Bel Puente  YOB: 1940  Gender: female  MRN: [id number]    CC:   Chief Complaint   Patient presents with    Follow-up     Right shoulder        HPI: Patient presents for recheck of right shoulder pain. Patient had a glenohumeral injection on 2023 and notes  relief with injection. At the patient's last visit we discussed mild degenerative changes and some myofascial pain and history of chronic neck pain. Recall that the patient fell on  and landed on her backside but had progressively worsening shoulder pain. Allergies   Allergen Reactions    Penicillins Other (See Comments) and Rash    Valproic Acid Other (See Comments)     pain  Other reaction(s): Other- (not listed) - Allergy     Past Medical History:   Diagnosis Date    Chronic pain     right foot    Menopause     BALJINDER on CPAP 10/24/2017    Persistent disorder of initiating or maintaining sleep 10/24/2017    Psychiatric disorder     anxiety-worry    Unspecified sleep apnea     cpap (bring DOS)     Past Surgical History:   Procedure Laterality Date    BREAST BIOPSY Bilateral     negative per pt    CATARACT REMOVAL   &    bilateral intraocular lenses    CYST INCISION AND DRAINAGE Left      &     HYSTERECTOMY (CERVIX STATUS UNKNOWN)  24 yrs ago    ORTHOPEDIC SURGERY      r ankle     Family History   Problem Relation Age of Onset    Heart Failure Mother     Cancer Father         parotid gland    Kidney Disease Sister          age 54 from kidney disease    Heart Failure Brother     Dementia Sister     Alzheimer's Disease Sister          age 80    Dementia Sister     Dementia Sister         vasculat-stroke    Malig Hypertherm Neg Hx     Pseudochol.  Deficiency Neg Hx     Delayed Awakening Neg Hx     Post-op Nausea/Vomiting Neg Hx     Emergence Delirium Neg Hx     Post-op Cognitive Dysfunction Neg Hx     Other Neg Hx     Breast Cancer Neg Hx      Social History     Socioeconomic History    Marital

## 2023-08-31 ENCOUNTER — APPOINTMENT (RX ONLY)
Dept: URBAN - METROPOLITAN AREA CLINIC 330 | Facility: CLINIC | Age: 83
Setting detail: DERMATOLOGY
End: 2023-08-31

## 2023-08-31 DIAGNOSIS — L30.4 ERYTHEMA INTERTRIGO: ICD-10-CM | Status: RESOLVED

## 2023-08-31 DIAGNOSIS — B35.1 TINEA UNGUIUM: ICD-10-CM | Status: INADEQUATELY CONTROLLED

## 2023-08-31 DIAGNOSIS — Z71.89 OTHER SPECIFIED COUNSELING: ICD-10-CM

## 2023-08-31 DIAGNOSIS — L57.8 OTHER SKIN CHANGES DUE TO CHRONIC EXPOSURE TO NONIONIZING RADIATION: ICD-10-CM

## 2023-08-31 DIAGNOSIS — D22 MELANOCYTIC NEVI: ICD-10-CM

## 2023-08-31 DIAGNOSIS — L82.1 OTHER SEBORRHEIC KERATOSIS: ICD-10-CM

## 2023-08-31 PROBLEM — D22.5 MELANOCYTIC NEVI OF TRUNK: Status: ACTIVE | Noted: 2023-08-31

## 2023-08-31 PROCEDURE — ? TREATMENT REGIMEN

## 2023-08-31 PROCEDURE — ? EDUCATIONAL RESOURCES PROVIDED

## 2023-08-31 PROCEDURE — ? OTHER

## 2023-08-31 PROCEDURE — ? ADDITIONAL NOTES

## 2023-08-31 PROCEDURE — ? FULL BODY SKIN EXAM

## 2023-08-31 PROCEDURE — 99203 OFFICE O/P NEW LOW 30 MIN: CPT

## 2023-08-31 PROCEDURE — ? COUNSELING

## 2023-08-31 ASSESSMENT — LOCATION DETAILED DESCRIPTION DERM
LOCATION DETAILED: RIGHT LATERAL ABDOMEN
LOCATION DETAILED: LEFT GREAT TOENAIL
LOCATION DETAILED: RIGHT GREAT TOENAIL
LOCATION DETAILED: UPPER STERNUM
LOCATION DETAILED: LEFT SUPERIOR UPPER BACK
LOCATION DETAILED: RIGHT INFERIOR UPPER BACK
LOCATION DETAILED: NASAL DORSUM

## 2023-08-31 ASSESSMENT — LOCATION SIMPLE DESCRIPTION DERM
LOCATION SIMPLE: LEFT UPPER BACK
LOCATION SIMPLE: NOSE
LOCATION SIMPLE: RIGHT UPPER BACK
LOCATION SIMPLE: RIGHT GREAT TOE
LOCATION SIMPLE: ABDOMEN
LOCATION SIMPLE: LEFT GREAT TOE
LOCATION SIMPLE: CHEST

## 2023-08-31 ASSESSMENT — LOCATION ZONE DERM
LOCATION ZONE: TOENAIL
LOCATION ZONE: TRUNK
LOCATION ZONE: NOSE

## 2023-08-31 NOTE — PROCEDURE: MIPS QUALITY
Quality 110: Preventive Care And Screening: Influenza Immunization: Influenza Immunization previously received during influenza season
Quality 47: Advance Care Plan: Advance care planning not documented, reason not otherwise specified.
Quality 130: Documentation Of Current Medications In The Medical Record: Current Medications Documented
Detail Level: Detailed
Quality 431: Preventive Care And Screening: Unhealthy Alcohol Use - Screening: Patient not identified as an unhealthy alcohol user when screened for unhealthy alcohol use using a systematic screening method
Quality 226: Preventive Care And Screening: Tobacco Use: Screening And Cessation Intervention: Patient screened for tobacco use and is an ex/non-smoker

## 2023-08-31 NOTE — PROCEDURE: ADDITIONAL NOTES
Detail Level: Simple
Additional Notes: Offered to treat with ln2, pt declines today
Render Risk Assessment In Note?: no
Additional Notes: Pt declines treatment today

## 2023-09-18 ENCOUNTER — OFFICE VISIT (OUTPATIENT)
Dept: ORTHOPEDIC SURGERY | Age: 83
End: 2023-09-18
Payer: MEDICARE

## 2023-09-18 DIAGNOSIS — M79.641 RIGHT HAND PAIN: Primary | ICD-10-CM

## 2023-09-18 PROCEDURE — G8417 CALC BMI ABV UP PARAM F/U: HCPCS | Performed by: NURSE PRACTITIONER

## 2023-09-18 PROCEDURE — 1090F PRES/ABSN URINE INCON ASSESS: CPT | Performed by: NURSE PRACTITIONER

## 2023-09-18 PROCEDURE — 1123F ACP DISCUSS/DSCN MKR DOCD: CPT | Performed by: NURSE PRACTITIONER

## 2023-09-18 PROCEDURE — G8427 DOCREV CUR MEDS BY ELIG CLIN: HCPCS | Performed by: NURSE PRACTITIONER

## 2023-09-18 PROCEDURE — G8400 PT W/DXA NO RESULTS DOC: HCPCS | Performed by: NURSE PRACTITIONER

## 2023-09-18 PROCEDURE — 1036F TOBACCO NON-USER: CPT | Performed by: NURSE PRACTITIONER

## 2023-09-18 PROCEDURE — 99214 OFFICE O/P EST MOD 30 MIN: CPT | Performed by: NURSE PRACTITIONER

## 2023-09-18 RX ORDER — MELOXICAM 15 MG/1
15 TABLET ORAL DAILY
Qty: 28 TABLET | Refills: 0 | Status: SHIPPED | OUTPATIENT
Start: 2023-09-18 | End: 2023-10-16

## 2023-09-18 RX ORDER — METHYLPREDNISOLONE 4 MG/1
TABLET ORAL
Qty: 1 KIT | Refills: 0 | Status: SHIPPED | OUTPATIENT
Start: 2023-09-18

## 2023-09-18 NOTE — PROGRESS NOTES
[facility] Note    Name: Melissa Barrios  YOB: 1940  Gender: female  MRN: [id number]      CC: Patient referred for evaluation of right upper extremity pain    HPI: Melissa Barrios is a 79 y.o. female right hand dominant with a chief complaint of wrist pain. She reports she took a fall approximate 3 weeks ago. She said she slid into a plastic container with outstretched hands. She has been taking turmeric and using ice. She denies any prior injury. ROS/Meds/PSH/PMH/FH/SH: I personally reviewed the patients standard intake form. Pertinents are discussed in the HPI    Physical Examination:  General: Awake and alert. HEENT: Normocephalic, atraumatic  CV/Pulm: Breathing even and unlabored  Skin: No obvious rashes noted. Lymphatic: No obvious evidence of lymphedema or lymphadenopathy    Musculoskeletal Exam:  Examination on the right upper extremity demonstrates cap refill < 5 seconds in all fingers  Patient has mild swelling to the right wrist and hand. There is no ecchymosis present. She is nontender of the distal ulna or distal radius. She is slightly tender at the thumb CMC joint. She is not able to make a full composite fist due to swelling. She is tender diffusely throughout the hand wrist and forearm. She denies paresthesia. He has good capillary fill. Imaging / Electrodiagnostic Tests:     X-rays include a three-view right wrist reviewed. No acute fracture seen. 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 Assessment:   1. Right hand pain        Plan:   We discussed the diagnosis and different treatment options. We discussed observation, therapy, antiinflammatory medications and other pertinent treatment modalities. After discussing in detail the patient elects to proceed with Medrol, Mobic, compressive sleeve. We will get her started in therapy at the [facility].   I will

## 2023-09-18 NOTE — PROGRESS NOTES
The patient was prescribed and fitted with a Reparel wrist sleeve for the right wrist, size medium. Patient read and signed documenting they understand and agree to [facility]'s current DME return policy.

## 2023-09-25 ENCOUNTER — EVALUATION (OUTPATIENT)
Age: 83
End: 2023-09-25
Payer: MEDICARE

## 2023-09-25 DIAGNOSIS — R29.898 DECREASED GRIP STRENGTH: ICD-10-CM

## 2023-09-25 DIAGNOSIS — M79.641 RIGHT HAND PAIN: ICD-10-CM

## 2023-09-25 DIAGNOSIS — M25.531 WRIST PAIN, RIGHT: Primary | ICD-10-CM

## 2023-09-25 PROCEDURE — 97110 THERAPEUTIC EXERCISES: CPT | Performed by: OCCUPATIONAL THERAPIST

## 2023-09-25 PROCEDURE — 97165 OT EVAL LOW COMPLEX 30 MIN: CPT | Performed by: OCCUPATIONAL THERAPIST

## 2023-09-25 NOTE — PROGRESS NOTES
[facility] Prud[facility] Tom[facility]  [address] [place] [id number]  Dept: [phone]      Occupational Therapy Initial Assessment     Referring MD: DEONTE Jacques*    Diagnosis:     ICD-10-CM    1. Wrist pain, right  M25.531       2. Decreased  strength  R29.898            Therapy precautions: None; limit lifting, pushing, pulling     History of injury/onset : Pt fell about a month ago, she slipped on a container and fell onto her right hand. Pt did not feel it initially, pt was picking up things in the yard and then felt it. Total Direct Treatment Time: 20 min  Total In Office Time: 55 min  Modifier needed: KX needed  Episode visit count:  1     Preferred Name: Marnie Martinez MEDICAL HISTORY     PMHX & Meds:   Past Medical History:   Diagnosis Date    Chronic pain     right foot    Menopause     BALJINDER on CPAP 10/24/2017    Persistent disorder of initiating or maintaining sleep 10/24/2017    Psychiatric disorder     anxiety-worry    Unspecified sleep apnea     cpap (bring DOS)   ,   Past Surgical History:   Procedure Laterality Date    BREAST BIOPSY Bilateral 1996    negative per pt    CATARACT REMOVAL  7/98 &9/98    bilateral intraocular lenses    CYST INCISION AND DRAINAGE Left     1999 & 2000    HYSTERECTOMY (CERVIX STATUS UNKNOWN)  24 yrs ago    ORTHOPEDIC SURGERY      r ankle      Medications. : Reviewed in chart  Allergies: Allergies   Allergen Reactions    Penicillins Other (See Comments) and Rash    Valproic Acid Other (See Comments)     pain  Other reaction(s): Other- (not listed) - Allergy          Medications: reviewed in chart  Home program compliance: Completing as prescribed    SUBJECTIVE     Current Symptoms/Chief complaints: No chief complaint on file.           Chief complaint/history of injury:   Date symptoms began: about a month ago   Rivka Ramos of condition:Recent onset (initial onset within last 3 months)  Primary cause of current episode: Traumatic  How did

## 2023-09-28 ENCOUNTER — TREATMENT (OUTPATIENT)
Age: 83
End: 2023-09-28
Payer: MEDICARE

## 2023-09-28 DIAGNOSIS — M25.531 WRIST PAIN, RIGHT: Primary | ICD-10-CM

## 2023-09-28 DIAGNOSIS — M79.641 RIGHT HAND PAIN: ICD-10-CM

## 2023-09-28 DIAGNOSIS — R29.898 DECREASED GRIP STRENGTH: ICD-10-CM

## 2023-09-28 PROCEDURE — 97140 MANUAL THERAPY 1/> REGIONS: CPT | Performed by: OCCUPATIONAL THERAPIST

## 2023-09-28 PROCEDURE — 97022 WHIRLPOOL THERAPY: CPT | Performed by: OCCUPATIONAL THERAPIST

## 2023-09-28 PROCEDURE — 97110 THERAPEUTIC EXERCISES: CPT | Performed by: OCCUPATIONAL THERAPIST

## 2023-09-28 NOTE — PROGRESS NOTES
[facility] Burt Crew  [address] [facility] [address]  Dept: [phone]      Occupational Therapy Daily Note      Referring MD: DEONTE Nance*    Diagnosis:     ICD-10-CM    1. Wrist pain, right  M25.531       2. Decreased  strength  R29.898       3. Right hand pain [M79.641]  M79.641            Therapy precautions: None; limit lifting, pushing, pulling     History of injury/onset : Pt fell about a month ago, she slipped on a container and fell onto her right hand. Pt did not feel it initially, pt was picking up things in the yard and then felt it. Total Direct Treatment Time: 65 min  Total In Office Time: 70 min  Modifier needed: KX needed  Episode visit count:  2     Preferred Name: Philip Garcia MEDICAL HISTORY     PMHX & Meds:   Past Medical History:   Diagnosis Date    Chronic pain     right foot    Menopause     BALJINDER on CPAP 10/24/2017    Persistent disorder of initiating or maintaining sleep 10/24/2017    Psychiatric disorder     anxiety-worry    Unspecified sleep apnea     cpap (bring DOS)   ,   Past Surgical History:   Procedure Laterality Date    BREAST BIOPSY Bilateral 1996    negative per pt    CATARACT REMOVAL  7/98 &9/98    bilateral intraocular lenses    CYST INCISION AND DRAINAGE Left     1999 & 2000    HYSTERECTOMY (CERVIX STATUS UNKNOWN)  24 yrs ago    ORTHOPEDIC SURGERY      r ankle      Medications. : Reviewed in chart  Allergies: Allergies   Allergen Reactions    Penicillins Other (See Comments) and Rash    Valproic Acid Other (See Comments)     pain  Other reaction(s):  Other- (not listed) - Allergy          Medications: reviewed in chart  Home program compliance: Completing as prescribed    SUBJECTIVE     Chief complaint/history of injury:   Date symptoms began: about a month ago   Kimmy Sherwood of condition:Recent onset (initial onset within last 3 months)  Primary cause of current episode: Traumatic  How did symptoms start: Pt fell/slid onto

## 2023-09-29 NOTE — PROGRESS NOTES
[facility] Barroso  [address] [place] [id number]  Dept: [phone]      Occupational Therapy Daily Note      Referring MD: DEONTE Edwards*    Diagnosis:     ICD-10-CM    1. Wrist pain, right  M25.531       2. Decreased  strength  R29.898       3. Right hand pain [M79.641]  M79.641            Therapy precautions: None; limit lifting, pushing, pulling     History of injury/onset : Pt fell about a month ago, she slipped on a container and fell onto her right hand. Pt did not feel it initially, pt was picking up things in the yard and then felt it. Total Direct Treatment Time: 45 min  Total In Office Time: 55 min  Modifier needed: KX needed  Episode visit count:  3     Preferred Name: Ashkan Post MEDICAL HISTORY     PMHX & Meds:   Past Medical History:   Diagnosis Date    Chronic pain     right foot    Menopause     BALJINDER on CPAP 10/24/2017    Persistent disorder of initiating or maintaining sleep 10/24/2017    Psychiatric disorder     anxiety-worry    Unspecified sleep apnea     cpap (bring DOS)   ,   Past Surgical History:   Procedure Laterality Date    BREAST BIOPSY Bilateral 1996    negative per pt    CATARACT REMOVAL  7/98 &9/98    bilateral intraocular lenses    CYST INCISION AND DRAINAGE Left     1999 & 2000    HYSTERECTOMY (CERVIX STATUS UNKNOWN)  24 yrs ago    ORTHOPEDIC SURGERY      r ankle      Medications. : Reviewed in chart  Allergies: Allergies   Allergen Reactions    Penicillins Other (See Comments) and Rash    Valproic Acid Other (See Comments)     pain  Other reaction(s):  Other- (not listed) - Allergy          Medications: reviewed in chart  Home program compliance: Completing as prescribed    SUBJECTIVE     Chief complaint/history of injury:   Date symptoms began: about a month ago   Sampson Ochoa of condition:Recent onset (initial onset within last 3 months)  Primary cause of current episode: Traumatic  How did symptoms start: Pt fell/slid onto

## 2023-10-02 ENCOUNTER — TREATMENT (OUTPATIENT)
Age: 83
End: 2023-10-02

## 2023-10-02 DIAGNOSIS — M79.641 RIGHT HAND PAIN: ICD-10-CM

## 2023-10-02 DIAGNOSIS — R29.898 DECREASED GRIP STRENGTH: ICD-10-CM

## 2023-10-02 DIAGNOSIS — M25.531 WRIST PAIN, RIGHT: Primary | ICD-10-CM

## 2023-10-05 ENCOUNTER — TREATMENT (OUTPATIENT)
Age: 83
End: 2023-10-05

## 2023-10-05 DIAGNOSIS — R29.898 DECREASED GRIP STRENGTH: ICD-10-CM

## 2023-10-05 DIAGNOSIS — M25.531 WRIST PAIN, RIGHT: Primary | ICD-10-CM

## 2023-10-05 DIAGNOSIS — M79.641 RIGHT HAND PAIN: ICD-10-CM

## 2023-10-06 ENCOUNTER — TELEPHONE (OUTPATIENT)
Dept: ORTHOPEDIC SURGERY | Age: 83
End: 2023-10-06

## 2023-10-13 DIAGNOSIS — M79.641 RIGHT HAND PAIN: ICD-10-CM

## 2023-10-13 RX ORDER — MELOXICAM 15 MG/1
15 TABLET ORAL DAILY
Qty: 28 TABLET | Refills: 0 | OUTPATIENT
Start: 2023-10-13

## 2023-11-16 ENCOUNTER — CLINICAL DOCUMENTATION (OUTPATIENT)
Dept: ORTHOPEDIC SURGERY | Age: 83
End: 2023-11-16

## 2023-12-06 ENCOUNTER — TELEPHONE (OUTPATIENT)
Dept: SLEEP MEDICINE | Age: 83
End: 2023-12-06

## 2024-05-02 NOTE — PROGRESS NOTES
Hypersomnia    BALJINDER on CPAP    Right hand pain          Past Surgical History:   Procedure Laterality Date    BREAST BIOPSY Bilateral     negative per pt    CATARACT REMOVAL   &    bilateral intraocular lenses    CYST INCISION AND DRAINAGE Left      &     HYSTERECTOMY (CERVIX STATUS UNKNOWN)  24 yrs ago    ORTHOPEDIC SURGERY      r ankle       @PULÁNGELA@        Social History     Socioeconomic History    Marital status:      Spouse name: Not on file    Number of children: Not on file    Years of education: Not on file    Highest education level: Not on file   Occupational History    Not on file   Tobacco Use    Smoking status: Never    Smokeless tobacco: Never   Substance and Sexual Activity    Alcohol use: No    Drug use: No    Sexual activity: Not on file   Other Topics Concern    Not on file   Social History Narrative    Not on file     Social Determinants of Health     Financial Resource Strain: Not on file   Food Insecurity: Not on file   Transportation Needs: Not on file   Physical Activity: Not on file   Stress: Not on file   Social Connections: Not on file   Intimate Partner Violence: Not on file   Housing Stability: Not on file         Family History   Problem Relation Age of Onset    Heart Failure Mother     Cancer Father         parotid gland    Kidney Disease Sister          age 55 from kidney disease    Heart Failure Brother     Dementia Sister     Alzheimer's Disease Sister          age 90    Dementia Sister     Dementia Sister         vasculat-stroke    Malig Hypertherm Neg Hx     Pseudochol. Deficiency Neg Hx     Delayed Awakening Neg Hx     Post-op Nausea/Vomiting Neg Hx     Emergence Delirium Neg Hx     Post-op Cognitive Dysfunction Neg Hx     Other Neg Hx     Breast Cancer Neg Hx          Allergies   Allergen Reactions    Penicillins Other (See Comments) and Rash    Valproic Acid Other (See Comments)     pain  Other reaction(s): Other- (not listed) - Allergy

## 2024-05-03 ENCOUNTER — OFFICE VISIT (OUTPATIENT)
Dept: SLEEP MEDICINE | Age: 84
End: 2024-05-03
Payer: MEDICARE

## 2024-05-03 VITALS
BODY MASS INDEX: 30.65 KG/M2 | SYSTOLIC BLOOD PRESSURE: 144 MMHG | DIASTOLIC BLOOD PRESSURE: 74 MMHG | WEIGHT: 173 LBS | HEIGHT: 63 IN | OXYGEN SATURATION: 95 % | HEART RATE: 76 BPM | TEMPERATURE: 97.9 F | RESPIRATION RATE: 16 BRPM

## 2024-05-03 DIAGNOSIS — G47.10 HYPERSOMNIA: ICD-10-CM

## 2024-05-03 DIAGNOSIS — G47.33 OSA ON CPAP: Primary | ICD-10-CM

## 2024-05-03 DIAGNOSIS — G47.00 PERSISTENT DISORDER OF INITIATING OR MAINTAINING SLEEP: ICD-10-CM

## 2024-05-03 PROCEDURE — 99214 OFFICE O/P EST MOD 30 MIN: CPT | Performed by: INTERNAL MEDICINE

## 2024-05-03 PROCEDURE — 1090F PRES/ABSN URINE INCON ASSESS: CPT | Performed by: INTERNAL MEDICINE

## 2024-05-03 PROCEDURE — 1036F TOBACCO NON-USER: CPT | Performed by: INTERNAL MEDICINE

## 2024-05-03 PROCEDURE — G8427 DOCREV CUR MEDS BY ELIG CLIN: HCPCS | Performed by: INTERNAL MEDICINE

## 2024-05-03 PROCEDURE — G8417 CALC BMI ABV UP PARAM F/U: HCPCS | Performed by: INTERNAL MEDICINE

## 2024-05-03 PROCEDURE — 1123F ACP DISCUSS/DSCN MKR DOCD: CPT | Performed by: INTERNAL MEDICINE

## 2024-05-03 PROCEDURE — G8400 PT W/DXA NO RESULTS DOC: HCPCS | Performed by: INTERNAL MEDICINE

## 2024-05-03 ASSESSMENT — SLEEP AND FATIGUE QUESTIONNAIRES
HOW LIKELY ARE YOU TO NOD OFF OR FALL ASLEEP WHILE SITTING QUIETLY AFTER LUNCH WITHOUT ALCOHOL: WOULD NEVER DOZE
HOW LIKELY ARE YOU TO NOD OFF OR FALL ASLEEP WHILE LYING DOWN TO REST IN THE AFTERNOON WHEN CIRCUMSTANCES PERMIT: HIGH CHANCE OF DOZING
HOW LIKELY ARE YOU TO NOD OFF OR FALL ASLEEP WHILE SITTING INACTIVE IN A PUBLIC PLACE: WOULD NEVER DOZE
HOW LIKELY ARE YOU TO NOD OFF OR FALL ASLEEP WHILE SITTING AND TALKING TO SOMEONE: SLIGHT CHANCE OF DOZING
HOW LIKELY ARE YOU TO NOD OFF OR FALL ASLEEP WHEN YOU ARE A PASSENGER IN A CAR FOR AN HOUR WITHOUT A BREAK: HIGH CHANCE OF DOZING
HOW LIKELY ARE YOU TO NOD OFF OR FALL ASLEEP WHILE SITTING AND READING: HIGH CHANCE OF DOZING
HOW LIKELY ARE YOU TO NOD OFF OR FALL ASLEEP IN A CAR, WHILE STOPPED FOR A FEW MINUTES IN TRAFFIC: SLIGHT CHANCE OF DOZING
ESS TOTAL SCORE: 11
HOW LIKELY ARE YOU TO NOD OFF OR FALL ASLEEP WHILE WATCHING TV: WOULD NEVER DOZE

## 2024-08-29 ENCOUNTER — APPOINTMENT (RX ONLY)
Dept: URBAN - METROPOLITAN AREA CLINIC 330 | Facility: CLINIC | Age: 84
Setting detail: DERMATOLOGY
End: 2024-08-29

## 2024-08-29 DIAGNOSIS — L30.4 ERYTHEMA INTERTRIGO: ICD-10-CM

## 2024-08-29 DIAGNOSIS — L57.8 OTHER SKIN CHANGES DUE TO CHRONIC EXPOSURE TO NONIONIZING RADIATION: ICD-10-CM

## 2024-08-29 DIAGNOSIS — D22 MELANOCYTIC NEVI: ICD-10-CM | Status: STABLE

## 2024-08-29 DIAGNOSIS — L82.0 INFLAMED SEBORRHEIC KERATOSIS: ICD-10-CM

## 2024-08-29 PROBLEM — D22.5 MELANOCYTIC NEVI OF TRUNK: Status: ACTIVE | Noted: 2024-08-29

## 2024-08-29 PROCEDURE — ? OTHER

## 2024-08-29 PROCEDURE — ? LIQUID NITROGEN

## 2024-08-29 PROCEDURE — ? TREATMENT REGIMEN

## 2024-08-29 PROCEDURE — ? COUNSELING

## 2024-08-29 PROCEDURE — 17110 DESTRUCTION B9 LES UP TO 14: CPT

## 2024-08-29 PROCEDURE — ? FULL BODY SKIN EXAM

## 2024-08-29 PROCEDURE — ? MEDICAL PHOTOGRAPHY REVIEW

## 2024-08-29 PROCEDURE — 99213 OFFICE O/P EST LOW 20 MIN: CPT | Mod: 25

## 2024-08-29 ASSESSMENT — LOCATION SIMPLE DESCRIPTION DERM
LOCATION SIMPLE: LEFT UPPER BACK
LOCATION SIMPLE: CHEST
LOCATION SIMPLE: ABDOMEN
LOCATION SIMPLE: RIGHT LOWER BACK

## 2024-08-29 ASSESSMENT — LOCATION DETAILED DESCRIPTION DERM
LOCATION DETAILED: UPPER STERNUM
LOCATION DETAILED: RIGHT INFERIOR LATERAL MIDBACK
LOCATION DETAILED: RIGHT LATERAL ABDOMEN
LOCATION DETAILED: LEFT SUPERIOR UPPER BACK

## 2024-08-29 ASSESSMENT — LOCATION ZONE DERM: LOCATION ZONE: TRUNK

## 2024-08-29 ASSESSMENT — SEVERITY ASSESSMENT: SEVERITY: CLEAR

## 2024-08-29 NOTE — PROCEDURE: MIPS QUALITY
Detail Level: Detailed
Quality 47: Advance Care Plan: Advance care planning not documented, reason not otherwise specified.
Quality 226: Preventive Care And Screening: Tobacco Use: Screening And Cessation Intervention: Patient screened for tobacco use and is an ex/non-smoker
Quality 402: Tobacco Use And Help With Quitting Among Adolescents: Patient screened for tobacco and never smoked
Quality 130: Documentation Of Current Medications In The Medical Record: Current Medications Documented
Additional Notes: 65+
Quality 431: Preventive Care And Screening: Unhealthy Alcohol Use - Screening: Patient not identified as an unhealthy alcohol user when screened for unhealthy alcohol use using a systematic screening method

## 2024-08-29 NOTE — PROCEDURE: LIQUID NITROGEN
Medical Necessity Information: It is in your best interest to select a reason for this procedure from the list below. All of these items fulfill various CMS LCD requirements except the new and changing color options.
Show Aperture Variable?: Yes
Spray Paint Technique: No
Number Of Freeze-Thaw Cycles: 2 freeze-thaw cycles
Duration Of Freeze Thaw-Cycle (Seconds): 5-10
Post-Care Instructions: I reviewed with the patient in detail post-care instructions. Patient is to wear sunprotection, and avoid picking at any of the treated lesions. Pt may apply Vaseline to crusted or scabbing areas.
Medical Necessity Clause: This procedure was medically necessary because the lesions that were treated were:
Spray Paint Text: The liquid nitrogen was applied to the skin utilizing a spray paint frosting technique.
Application Tool (Optional): Liquid Nitrogen Sprayer
Aperture Size (Optional): C
Detail Level: Detailed
Consent: The patient's consent was obtained including but not limited to risks of crusting, scabbing, blistering, scarring, darker or lighter pigmentary change, recurrence, incomplete removal and infection.

## 2024-10-04 ENCOUNTER — OFFICE VISIT (OUTPATIENT)
Dept: SLEEP MEDICINE | Age: 84
End: 2024-10-04

## 2024-10-04 VITALS
WEIGHT: 169 LBS | DIASTOLIC BLOOD PRESSURE: 84 MMHG | HEART RATE: 64 BPM | HEIGHT: 63 IN | RESPIRATION RATE: 14 BRPM | BODY MASS INDEX: 29.95 KG/M2 | OXYGEN SATURATION: 98 % | SYSTOLIC BLOOD PRESSURE: 142 MMHG

## 2024-10-04 DIAGNOSIS — G47.33 OSA ON CPAP: Primary | ICD-10-CM

## 2024-10-04 DIAGNOSIS — G47.00 PERSISTENT DISORDER OF INITIATING OR MAINTAINING SLEEP: ICD-10-CM

## 2024-10-04 DIAGNOSIS — G47.10 HYPERSOMNIA: ICD-10-CM

## 2024-10-04 RX ORDER — FAMOTIDINE 40 MG/1
40 TABLET, FILM COATED ORAL EVERY EVENING
Qty: 30 TABLET | Refills: 5 | Status: SHIPPED | OUTPATIENT
Start: 2024-10-04

## 2024-10-04 RX ORDER — LOSARTAN POTASSIUM 25 MG/1
25 TABLET ORAL EVERY MORNING
COMMUNITY
Start: 2024-08-08

## 2024-10-04 ASSESSMENT — SLEEP AND FATIGUE QUESTIONNAIRES
HOW LIKELY ARE YOU TO NOD OFF OR FALL ASLEEP WHEN YOU ARE A PASSENGER IN A CAR FOR AN HOUR WITHOUT A BREAK: MODERATE CHANCE OF DOZING
ESS TOTAL SCORE: 14
HOW LIKELY ARE YOU TO NOD OFF OR FALL ASLEEP IN A CAR, WHILE STOPPED FOR A FEW MINUTES IN TRAFFIC: MODERATE CHANCE OF DOZING
HOW LIKELY ARE YOU TO NOD OFF OR FALL ASLEEP WHILE SITTING AND READING: MODERATE CHANCE OF DOZING
HOW LIKELY ARE YOU TO NOD OFF OR FALL ASLEEP WHILE SITTING AND TALKING TO SOMEONE: WOULD NEVER DOZE
HOW LIKELY ARE YOU TO NOD OFF OR FALL ASLEEP WHILE WATCHING TV: WOULD NEVER DOZE
HOW LIKELY ARE YOU TO NOD OFF OR FALL ASLEEP WHILE SITTING INACTIVE IN A PUBLIC PLACE: MODERATE CHANCE OF DOZING
HOW LIKELY ARE YOU TO NOD OFF OR FALL ASLEEP WHILE LYING DOWN TO REST IN THE AFTERNOON WHEN CIRCUMSTANCES PERMIT: HIGH CHANCE OF DOZING
HOW LIKELY ARE YOU TO NOD OFF OR FALL ASLEEP WHILE SITTING QUIETLY AFTER LUNCH WITHOUT ALCOHOL: HIGH CHANCE OF DOZING

## 2024-10-28 ENCOUNTER — TELEPHONE (OUTPATIENT)
Dept: SLEEP MEDICINE | Age: 84
End: 2024-10-28

## 2024-10-28 NOTE — TELEPHONE ENCOUNTER
Pt states that [facility] told her pressure needs to be increased. She has changed the mask and not having leaks, but AHI is still high.   Please call her to let her know what the new pressure will be.

## 2024-11-05 ENCOUNTER — TELEPHONE (OUTPATIENT)
Dept: PULMONOLOGY | Age: 84
End: 2024-11-05

## 2024-11-08 ENCOUNTER — TELEPHONE (OUTPATIENT)
Dept: SLEEP MEDICINE | Age: 84
End: 2024-11-08

## 2024-11-08 NOTE — TELEPHONE ENCOUNTER
Pt's pressure was adjusted to 14cm/h2o on 10/31/24. Pt called and states it is still too strong.

## 2024-12-04 ENCOUNTER — TELEPHONE (OUTPATIENT)
Dept: SLEEP MEDICINE | Age: 84
End: 2024-12-04

## 2024-12-04 NOTE — TELEPHONE ENCOUNTER
Pt states that CPAP setting was changed. That did not work for her. She states that she went back to old mask and needs pressure changed back to previous setting. She asked that pressure be changed and wants to talk with someone TODAY.   She can be reached at [phone].

## 2024-12-10 ENCOUNTER — TELEPHONE (OUTPATIENT)
Dept: SLEEP MEDICINE | Age: 84
End: 2024-12-10

## 2025-01-20 ENCOUNTER — INITIAL CONSULT (OUTPATIENT)
Age: 85
End: 2025-01-20
Payer: MEDICARE

## 2025-01-20 VITALS
SYSTOLIC BLOOD PRESSURE: 159 MMHG | HEART RATE: 81 BPM | DIASTOLIC BLOOD PRESSURE: 92 MMHG | WEIGHT: 171 LBS | HEIGHT: 63 IN | BODY MASS INDEX: 30.3 KG/M2

## 2025-01-20 DIAGNOSIS — G47.33 OSA ON CPAP: ICD-10-CM

## 2025-01-20 DIAGNOSIS — I35.1 NONRHEUMATIC AORTIC VALVE INSUFFICIENCY: Primary | ICD-10-CM

## 2025-01-20 DIAGNOSIS — E78.2 MIXED HYPERLIPIDEMIA: ICD-10-CM

## 2025-01-20 DIAGNOSIS — I10 ESSENTIAL HYPERTENSION: ICD-10-CM

## 2025-01-20 DIAGNOSIS — R94.31 ABNORMAL EKG: ICD-10-CM

## 2025-01-20 PROCEDURE — 3077F SYST BP >= 140 MM HG: CPT | Performed by: INTERNAL MEDICINE

## 2025-01-20 PROCEDURE — 1090F PRES/ABSN URINE INCON ASSESS: CPT | Performed by: INTERNAL MEDICINE

## 2025-01-20 PROCEDURE — G8400 PT W/DXA NO RESULTS DOC: HCPCS | Performed by: INTERNAL MEDICINE

## 2025-01-20 PROCEDURE — G8417 CALC BMI ABV UP PARAM F/U: HCPCS | Performed by: INTERNAL MEDICINE

## 2025-01-20 PROCEDURE — 3080F DIAST BP >= 90 MM HG: CPT | Performed by: INTERNAL MEDICINE

## 2025-01-20 PROCEDURE — 1126F AMNT PAIN NOTED NONE PRSNT: CPT | Performed by: INTERNAL MEDICINE

## 2025-01-20 PROCEDURE — 1159F MED LIST DOCD IN RCRD: CPT | Performed by: INTERNAL MEDICINE

## 2025-01-20 PROCEDURE — 99204 OFFICE O/P NEW MOD 45 MIN: CPT | Performed by: INTERNAL MEDICINE

## 2025-01-20 PROCEDURE — 1123F ACP DISCUSS/DSCN MKR DOCD: CPT | Performed by: INTERNAL MEDICINE

## 2025-01-20 PROCEDURE — G8427 DOCREV CUR MEDS BY ELIG CLIN: HCPCS | Performed by: INTERNAL MEDICINE

## 2025-01-20 PROCEDURE — 93000 ELECTROCARDIOGRAM COMPLETE: CPT | Performed by: INTERNAL MEDICINE

## 2025-01-20 PROCEDURE — 1036F TOBACCO NON-USER: CPT | Performed by: INTERNAL MEDICINE

## 2025-01-20 RX ORDER — LOSARTAN POTASSIUM 50 MG/1
50 TABLET ORAL EVERY MORNING
COMMUNITY
Start: 2024-11-21

## 2025-01-20 ASSESSMENT — ENCOUNTER SYMPTOMS
HEMOPTYSIS: 0
HOARSE VOICE: 0
STRIDOR: 0
WHEEZING: 0
HEMATOCHEZIA: 0
EYE REDNESS: 0
HEMATEMESIS: 0
ABDOMINAL PAIN: 0
DOUBLE VISION: 0

## 2025-01-20 NOTE — PROGRESS NOTES
[facility] CARDIOLOGY  [address] [place] [address], [address]  [place] [address]  PHONE: [phone]          25    NAME:  Dolores Pina  : 1940  MRN: [id number]         SUBJECTIVE:   Dolores Pina is a 84 y.o. female seen for a visit regarding the following:     Chief Complaint   Patient presents with    Nonrheumatic aortic valve insufficiency    Hypertension    Hyperlipidemia    Consultation    Sleep Apnea           HPI:    Cardio problem list:  1.  Hypertension  2.  Hyperlipidemia  3.  Sleep apnea  4.  Aortic insufficiency  -Echo 10/10/2019-EF 60 to 65%, mildly dilated LA, mild to moderate AR, RVSP 21  5. Anxiety  6. Palpitations      Dear Dr. Juarez,  I saw Ms. Pina who is a pleasant 84-year-old woman cardiovascular consultation for aortic regurgitation/insufficiency , Palpitations with known underlying hypertension, hyperlipidemia and sleep apnea.    Aortic insufficiency-likely hypertensive related.  She has no significant dyspnea exertion or lower extremity edema orthopnea PND.    Hypertension-uncontrolled at this point-she has been very anxious and wants to use CBT/holistic methods to help with her blood pressures.  I explained to her that at her age, her blood pressure is not because of anything she has done and it is because of gradual hardening of the arteries and she would benefit from taking her losartan.  She has not been taking it regularly because of concern for fatigue after taking it.  She can certainly take it every night to mitigate the symptoms.  No significant lower extremity edema orthopnea PND.    Hyperlipidemia-diet controlled.  Not interested in medical therapy for the same.  At her stage in life, if she has made it this far, I believe she has achieved what is needed.  No previous CVAs      Past Medical History, Past Surgical History, Family history, Social History, and Medications were all reviewed with the patient today and updated as necessary.     Allergies

## 2025-03-19 ENCOUNTER — APPOINTMENT (OUTPATIENT)
Dept: URBAN - METROPOLITAN AREA CLINIC 330 | Facility: CLINIC | Age: 85
Setting detail: DERMATOLOGY
End: 2025-03-19

## 2025-03-19 DIAGNOSIS — T07XXXA INSECT BITE, NONVENOMOUS, OF OTHER, MULTIPLE, AND UNSPECIFIED SITES, WITHOUT MENTION OF INFECTION: ICD-10-CM

## 2025-03-19 PROBLEM — D48.5 NEOPLASM OF UNCERTAIN BEHAVIOR OF SKIN: Status: ACTIVE | Noted: 2025-03-19

## 2025-03-19 PROBLEM — S10.86XA INSECT BITE OF OTHER SPECIFIED PART OF NECK, INITIAL ENCOUNTER: Status: ACTIVE | Noted: 2025-03-19

## 2025-03-19 PROCEDURE — 99212 OFFICE O/P EST SF 10 MIN: CPT | Mod: 25

## 2025-03-19 PROCEDURE — ? COUNSELING

## 2025-03-19 PROCEDURE — ? BIOPSY BY SHAVE METHOD

## 2025-03-19 PROCEDURE — ? ADDITIONAL NOTES

## 2025-03-19 PROCEDURE — 11102 TANGNTL BX SKIN SINGLE LES: CPT

## 2025-03-19 ASSESSMENT — LOCATION ZONE DERM: LOCATION ZONE: NECK

## 2025-03-19 ASSESSMENT — LOCATION DETAILED DESCRIPTION DERM: LOCATION DETAILED: LEFT LATERAL TRAPEZIAL NECK

## 2025-03-19 ASSESSMENT — LOCATION SIMPLE DESCRIPTION DERM: LOCATION SIMPLE: POSTERIOR NECK

## 2025-03-19 NOTE — PROCEDURE: MIPS QUALITY
Quality 402: Tobacco Use And Help With Quitting Among Adolescents: Patient screened for tobacco and never smoked
Quality 431: Preventive Care And Screening: Unhealthy Alcohol Use - Screening: Patient not identified as an unhealthy alcohol user when screened for unhealthy alcohol use using a systematic screening method
Quality 110: Preventive Care And Screening: Influenza Immunization: Influenza Immunization Administered during Influenza season
Quality 226: Preventive Care And Screening: Tobacco Use: Screening And Cessation Intervention: Patient screened for tobacco use and is an ex/non-smoker
Quality 47: Advance Care Plan: Advance care planning not documented, reason not otherwise specified.
Quality 130: Documentation Of Current Medications In The Medical Record: Current Medications Documented
Detail Level: Detailed

## 2025-03-19 NOTE — PROCEDURE: ADDITIONAL NOTES
Additional Notes: Recommended hydrocortisone cream
Render Risk Assessment In Note?: no
Detail Level: Simple

## 2025-04-18 ENCOUNTER — OFFICE VISIT (OUTPATIENT)
Age: 85
End: 2025-04-18
Payer: MEDICARE

## 2025-04-18 VITALS
WEIGHT: 161 LBS | HEART RATE: 60 BPM | HEIGHT: 63 IN | DIASTOLIC BLOOD PRESSURE: 82 MMHG | SYSTOLIC BLOOD PRESSURE: 152 MMHG | BODY MASS INDEX: 28.53 KG/M2

## 2025-04-18 DIAGNOSIS — R94.31 ABNORMAL EKG: ICD-10-CM

## 2025-04-18 DIAGNOSIS — E78.2 MIXED HYPERLIPIDEMIA: ICD-10-CM

## 2025-04-18 DIAGNOSIS — G47.33 OSA ON CPAP: ICD-10-CM

## 2025-04-18 DIAGNOSIS — I10 ESSENTIAL HYPERTENSION: ICD-10-CM

## 2025-04-18 DIAGNOSIS — I35.1 NONRHEUMATIC AORTIC VALVE INSUFFICIENCY: Primary | ICD-10-CM

## 2025-04-18 PROCEDURE — G8417 CALC BMI ABV UP PARAM F/U: HCPCS | Performed by: INTERNAL MEDICINE

## 2025-04-18 PROCEDURE — G8400 PT W/DXA NO RESULTS DOC: HCPCS | Performed by: INTERNAL MEDICINE

## 2025-04-18 PROCEDURE — 1036F TOBACCO NON-USER: CPT | Performed by: INTERNAL MEDICINE

## 2025-04-18 PROCEDURE — 3079F DIAST BP 80-89 MM HG: CPT | Performed by: INTERNAL MEDICINE

## 2025-04-18 PROCEDURE — G8428 CUR MEDS NOT DOCUMENT: HCPCS | Performed by: INTERNAL MEDICINE

## 2025-04-18 PROCEDURE — 99214 OFFICE O/P EST MOD 30 MIN: CPT | Performed by: INTERNAL MEDICINE

## 2025-04-18 PROCEDURE — 1123F ACP DISCUSS/DSCN MKR DOCD: CPT | Performed by: INTERNAL MEDICINE

## 2025-04-18 PROCEDURE — 3077F SYST BP >= 140 MM HG: CPT | Performed by: INTERNAL MEDICINE

## 2025-04-18 PROCEDURE — 1090F PRES/ABSN URINE INCON ASSESS: CPT | Performed by: INTERNAL MEDICINE

## 2025-04-18 PROCEDURE — 1126F AMNT PAIN NOTED NONE PRSNT: CPT | Performed by: INTERNAL MEDICINE

## 2025-04-18 RX ORDER — LANOLIN ALCOHOL/MO/W.PET/CERES
50 CREAM (GRAM) TOPICAL DAILY
COMMUNITY

## 2025-04-18 RX ORDER — ESCITALOPRAM OXALATE 5 MG/1
5 TABLET ORAL DAILY
COMMUNITY
Start: 2024-11-21

## 2025-04-18 ASSESSMENT — ENCOUNTER SYMPTOMS
DOUBLE VISION: 0
WHEEZING: 0
EYE REDNESS: 0
ABDOMINAL PAIN: 0
HEMATOCHEZIA: 0
HEMATEMESIS: 0
STRIDOR: 0
HEMOPTYSIS: 0
HOARSE VOICE: 0

## 2025-04-18 NOTE — PROGRESS NOTES
[facility] CARDIOLOGY  [address] [place] [address], [address]  [place] [address]  PHONE: [phone]          25    NAME:  Dolores Pina  : 1940  MRN: [id number]         SUBJECTIVE:   Dolores Pina is a 84 y.o. female seen for a visit regarding the following:     Chief Complaint   Patient presents with    Nonrheumatic aortic valve insufficiency           HPI:    Cardio problem list:  1.  Hypertension  2.  Hyperlipidemia  3.  Sleep apnea  4.  Aortic insufficiency  -Echo 10/10/2019-EF 60 to 65%, mildly dilated LA, mild to moderate AR, RVSP 21  5. Anxiety  6. Palpitations      Dear Dr. Juarez,  I saw Ms. Pina who is a pleasant 84-year-old woman cardiovascular follow-up for aortic regurgitation/insufficiency , Palpitations with known underlying hypertension, hyperlipidemia and sleep apnea.    We last met with her 3 months ago at which point we had suggested antihypertensive therapy-losartan especially with hypertension and aortic insufficiency.  She wanted to try lifestyle measures.  She says her home blood pressures have been better.  She has managed to lose 10 pounds since she last saw us.    Aortic insufficiency-likely hypertensive related.  She has no significant dyspnea exertion or lower extremity edema orthopnea PND.    Hypertension-uncontrolled at this point-she has been very anxious and wants to use CBT/holistic methods to help with her blood pressures.  I explained to her that at her age, her blood pressure is not because of anything she has done and it is because of gradual hardening of the arteries and she would benefit from taking her losartan.  She has been taking her losartan daily at this point and her pressures have been much better.  Mostly in the 120s to 130s range.  Occasionally 140 systolic but not otherwise.  No significant headaches or blurry vision.  No significant lower extremity edema orthopnea PND or any overt heart failure.  No chest pain in any way.    Hyperlipidemia-diet

## 2025-04-28 NOTE — PROGRESS NOTES
[facility]  [address]  [place] [address]  [phone]    Patient Name:  Dolores Pina  YOB: 1940      Office Visit 4/29/2025    CHIEF COMPLAINT:      Chief Complaint   Patient presents with    Follow-up    Sleep Apnea    CPAP/BiPAP       HISTORY OF PRESENT ILLNESS:      This patient was seen today for follow-up of sleep apnea.  Her sleep study was done in 1998 with AHI 18.6/hour and the lowest oxygen saturation was 89%.  Currently she is on auto CPAP at 14 cm with EPR 3 using F 30 I mask.  She is using and benefiting from her CPAP on a daily basis and average daily use is 6 hours and 14 minutes.  She has a median leak of 8 L/min and 95th percentile leak of 48.8 L/min.  Her AHI is 6.8/hr /hour.    She indicated she continues to have difficulty staying asleep and  I suggested possibly using melatonin at 2-5 mg to see if will help with that.  I encouraged her to continue moderate physical activity which seem to help her with her weight management.  She lives with her daughter which make her more comfortable.  She has symptoms suggesting possible acid reflux and she is taking Pepcid as needed help with that.  She has seen cardiology for evaluation of aortic regurgitation which is likely related to hypertension .  Echo suggested it is mild to moderate.  She was started on losartan 50 mg she will be followed in 6 months.  The [place] score is 12/24.          4/29/2025    11:31 AM 10/4/2024     3:57 PM 5/3/2024     1:21 PM 3/21/2023    10:50 AM 9/20/2022    11:30 AM 9/21/2021     1:00 PM 3/18/2021     2:00 PM   Sleep Medicine   Sitting and reading 3 2 3 3 3 0 2   Watching TV 0 0 0 0 3 0 0   Sitting, inactive in a public place (e.g. a theatre or a meeting) 1 2 0 2 3 0 0   As a passenger in a car for an hour without a break 1 2 3 1 2 0 2   Lying down to rest in the afternoon when circumstances permit 3 3 3 3 3 3 3   Sitting and talking to someone 0 0 1 1 0 1 1   Sitting

## 2025-04-29 ENCOUNTER — OFFICE VISIT (OUTPATIENT)
Dept: SLEEP MEDICINE | Age: 85
End: 2025-04-29
Payer: MEDICARE

## 2025-04-29 VITALS
HEART RATE: 72 BPM | DIASTOLIC BLOOD PRESSURE: 90 MMHG | OXYGEN SATURATION: 96 % | SYSTOLIC BLOOD PRESSURE: 148 MMHG | WEIGHT: 160 LBS | HEIGHT: 63 IN | BODY MASS INDEX: 28.35 KG/M2

## 2025-04-29 DIAGNOSIS — G47.10 HYPERSOMNIA: ICD-10-CM

## 2025-04-29 DIAGNOSIS — G47.33 OSA ON CPAP: Primary | ICD-10-CM

## 2025-04-29 DIAGNOSIS — G47.34 NOCTURNAL HYPOXEMIA: ICD-10-CM

## 2025-04-29 DIAGNOSIS — G47.00 PERSISTENT DISORDER OF INITIATING OR MAINTAINING SLEEP: ICD-10-CM

## 2025-04-29 PROCEDURE — 1160F RVW MEDS BY RX/DR IN RCRD: CPT | Performed by: INTERNAL MEDICINE

## 2025-04-29 PROCEDURE — 1036F TOBACCO NON-USER: CPT | Performed by: INTERNAL MEDICINE

## 2025-04-29 PROCEDURE — 1123F ACP DISCUSS/DSCN MKR DOCD: CPT | Performed by: INTERNAL MEDICINE

## 2025-04-29 PROCEDURE — 99214 OFFICE O/P EST MOD 30 MIN: CPT | Performed by: INTERNAL MEDICINE

## 2025-04-29 PROCEDURE — G2211 COMPLEX E/M VISIT ADD ON: HCPCS | Performed by: INTERNAL MEDICINE

## 2025-04-29 PROCEDURE — 1159F MED LIST DOCD IN RCRD: CPT | Performed by: INTERNAL MEDICINE

## 2025-04-29 PROCEDURE — G8427 DOCREV CUR MEDS BY ELIG CLIN: HCPCS | Performed by: INTERNAL MEDICINE

## 2025-04-29 PROCEDURE — G8417 CALC BMI ABV UP PARAM F/U: HCPCS | Performed by: INTERNAL MEDICINE

## 2025-04-29 PROCEDURE — 1090F PRES/ABSN URINE INCON ASSESS: CPT | Performed by: INTERNAL MEDICINE

## 2025-04-29 ASSESSMENT — SLEEP AND FATIGUE QUESTIONNAIRES
HOW LIKELY ARE YOU TO NOD OFF OR FALL ASLEEP WHILE SITTING AND READING: HIGH CHANCE OF DOZING
HOW LIKELY ARE YOU TO NOD OFF OR FALL ASLEEP WHILE LYING DOWN TO REST IN THE AFTERNOON WHEN CIRCUMSTANCES PERMIT: HIGH CHANCE OF DOZING
HOW LIKELY ARE YOU TO NOD OFF OR FALL ASLEEP WHILE SITTING INACTIVE IN A PUBLIC PLACE: SLIGHT CHANCE OF DOZING
ESS TOTAL SCORE: 12
HOW LIKELY ARE YOU TO NOD OFF OR FALL ASLEEP WHILE SITTING AND TALKING TO SOMEONE: WOULD NEVER DOZE
HOW LIKELY ARE YOU TO NOD OFF OR FALL ASLEEP WHILE SITTING QUIETLY AFTER LUNCH WITHOUT ALCOHOL: HIGH CHANCE OF DOZING
HOW LIKELY ARE YOU TO NOD OFF OR FALL ASLEEP WHEN YOU ARE A PASSENGER IN A CAR FOR AN HOUR WITHOUT A BREAK: SLIGHT CHANCE OF DOZING
HOW LIKELY ARE YOU TO NOD OFF OR FALL ASLEEP WHILE WATCHING TV: WOULD NEVER DOZE
HOW LIKELY ARE YOU TO NOD OFF OR FALL ASLEEP IN A CAR, WHILE STOPPED FOR A FEW MINUTES IN TRAFFIC: SLIGHT CHANCE OF DOZING

## 2025-05-12 RX ORDER — FAMOTIDINE 40 MG/1
40 TABLET, FILM COATED ORAL EVERY EVENING
Qty: 90 TABLET | Refills: 1 | Status: SHIPPED | OUTPATIENT
Start: 2025-05-12